# Patient Record
Sex: FEMALE | URBAN - NONMETROPOLITAN AREA
[De-identification: names, ages, dates, MRNs, and addresses within clinical notes are randomized per-mention and may not be internally consistent; named-entity substitution may affect disease eponyms.]

---

## 2023-10-18 ENCOUNTER — APPOINTMENT (RX ONLY)
Dept: URBAN - NONMETROPOLITAN AREA CLINIC 4 | Facility: CLINIC | Age: 33
Setting detail: DERMATOLOGY
End: 2023-10-18

## 2023-10-18 DIAGNOSIS — L73.9 FOLLICULAR DISORDER, UNSPECIFIED: ICD-10-CM

## 2023-10-18 DIAGNOSIS — L738 OTHER SPECIFIED DISEASES OF HAIR AND HAIR FOLLICLES: ICD-10-CM

## 2023-10-18 DIAGNOSIS — L663 OTHER SPECIFIED DISEASES OF HAIR AND HAIR FOLLICLES: ICD-10-CM

## 2023-10-18 PROBLEM — L02.426 FURUNCLE OF LEFT LOWER LIMB: Status: ACTIVE | Noted: 2023-10-18

## 2023-10-18 PROBLEM — L02.425 FURUNCLE OF RIGHT LOWER LIMB: Status: ACTIVE | Noted: 2023-10-18

## 2023-10-18 PROCEDURE — ? PRESCRIPTION

## 2023-10-18 PROCEDURE — ? COUNSELING

## 2023-10-18 PROCEDURE — 99203 OFFICE O/P NEW LOW 30 MIN: CPT

## 2023-10-18 PROCEDURE — ? PRESCRIPTION MEDICATION MANAGEMENT

## 2023-10-18 PROCEDURE — ? TREATMENT REGIMEN

## 2023-10-18 RX ORDER — CLINDAMYCIN PHOSPHATE 10 MG/ML
1% LOTION TOPICAL BID
Qty: 1 | Refills: 3 | Status: ERX | COMMUNITY
Start: 2023-10-18

## 2023-10-18 RX ADMIN — CLINDAMYCIN PHOSPHATE 1%: 10 LOTION TOPICAL at 00:00

## 2023-10-18 ASSESSMENT — LOCATION SIMPLE DESCRIPTION DERM
LOCATION SIMPLE: LEFT POSTERIOR THIGH
LOCATION SIMPLE: RIGHT POSTERIOR THIGH

## 2023-10-18 ASSESSMENT — LOCATION DETAILED DESCRIPTION DERM
LOCATION DETAILED: LEFT DISTAL POSTERIOR THIGH
LOCATION DETAILED: RIGHT DISTAL POSTERIOR THIGH

## 2023-10-18 ASSESSMENT — SEVERITY ASSESSMENT: SEVERITY: MILD TO MODERATE

## 2023-10-18 ASSESSMENT — LOCATION ZONE DERM: LOCATION ZONE: LEG

## 2023-10-18 NOTE — PROCEDURE: PRESCRIPTION MEDICATION MANAGEMENT
Render In Strict Bullet Format?: No
Detail Level: Zone
Initiate Treatment: Clindamycin 1% lotion BID

## 2023-12-18 ENCOUNTER — OFFICE VISIT (OUTPATIENT)
Dept: URGENT CARE | Facility: CLINIC | Age: 33
End: 2023-12-18
Payer: COMMERCIAL

## 2023-12-18 VITALS
OXYGEN SATURATION: 98 % | BODY MASS INDEX: 32.5 KG/M2 | WEIGHT: 176.6 LBS | HEIGHT: 62 IN | TEMPERATURE: 97.9 F | SYSTOLIC BLOOD PRESSURE: 130 MMHG | HEART RATE: 83 BPM | DIASTOLIC BLOOD PRESSURE: 87 MMHG | RESPIRATION RATE: 18 BRPM

## 2023-12-18 DIAGNOSIS — R68.89 FLU-LIKE SYMPTOMS: Primary | ICD-10-CM

## 2023-12-18 PROCEDURE — 87636 SARSCOV2 & INF A&B AMP PRB: CPT | Performed by: STUDENT IN AN ORGANIZED HEALTH CARE EDUCATION/TRAINING PROGRAM

## 2023-12-18 PROCEDURE — S9088 SERVICES PROVIDED IN URGENT: HCPCS | Performed by: STUDENT IN AN ORGANIZED HEALTH CARE EDUCATION/TRAINING PROGRAM

## 2023-12-18 PROCEDURE — 99203 OFFICE O/P NEW LOW 30 MIN: CPT | Performed by: STUDENT IN AN ORGANIZED HEALTH CARE EDUCATION/TRAINING PROGRAM

## 2023-12-18 RX ORDER — METHYLPREDNISOLONE 4 MG/1
TABLET ORAL
Qty: 21 TABLET | Refills: 0 | Status: SHIPPED | OUTPATIENT
Start: 2023-12-18

## 2023-12-18 RX ORDER — FLUTICASONE PROPIONATE 50 MCG
1 SPRAY, SUSPENSION (ML) NASAL DAILY
Qty: 16 G | Refills: 0 | Status: SHIPPED | OUTPATIENT
Start: 2023-12-18

## 2023-12-18 RX ORDER — ALBUTEROL SULFATE 90 UG/1
2 AEROSOL, METERED RESPIRATORY (INHALATION) EVERY 6 HOURS PRN
Qty: 8.5 G | Refills: 0 | Status: SHIPPED | OUTPATIENT
Start: 2023-12-18

## 2023-12-18 NOTE — PROGRESS NOTES
[facility]        NAME: Milana De La Fuente is a 33 y.o. female  : 1990    MRN: [id number]    Assessment and Plan   Flu-like symptoms [R68.89]  1. Flu-like symptoms  fluticasone (FLONASE) 50 mcg/act nasal spray    albuterol (ProAir HFA) 90 mcg/act inhaler    methylPREDNISolone 4 MG tablet therapy pack    Covid/Flu- Office Collect Normal        Suspect recurrence of illness especially given that son is sick with similar symptoms with started yesterday.  Will test for COVID and flu.  Provided isolation guidelines pending results.  Will start on Flonase for congestion.  No wheezing or respiratory distress noted on exam the patient is reporting symptoms especially at night so we will give provide and also Medrol pack.    Patient Instructions     See wrap up for details  Follow up with PCP in 3-5 days.  Proceed to  ER if symptoms worsen.    Chief Complaint     Chief Complaint   Patient presents with    Fever    Cough    Shortness of Breath     Asthma      Wheezing     Symptoms started 3 days ago. She states she can hear  herself wheezing. Fever was 102. Took tylenol this morning she said . No at home covid test was done. She said she had the flu 3 weeks ago          History of Present Illness     Today is day 4 of symptoms. Reports frontal headache, sinus pressure   tested pos for flu A. Since then has had persistent coughing and wheezing which worsened over the last 4 days.  Wheezing is worse at night and is daily and multiple times.  Used to use albuterol in the past but no longer has any  Tmax 102F 2 days ago, resolving with tylenol  Sick contacts at work and son sick with similar symptoms     Review of Systems   Review of Systems   Constitutional:  Positive for fever. Negative for chills.   HENT:  Positive for sinus pressure. Negative for ear pain and sore throat.    Eyes:  Negative for pain and visual disturbance.   Respiratory:  Positive for cough and wheezing. Negative for chest tightness and  "shortness of breath.    Cardiovascular:  Negative for chest pain and palpitations.   Gastrointestinal:  Negative for abdominal pain, constipation, diarrhea, nausea and vomiting.   Genitourinary:  Negative for dysuria, hematuria and menstrual problem.   Musculoskeletal:  Negative for arthralgias and back pain.   Skin:  Negative for color change and rash.   Neurological:  Positive for headaches. Negative for seizures and syncope.   Psychiatric/Behavioral:  Negative for dysphoric mood and suicidal ideas.    All other systems reviewed and are negative.    Current Medications       Current Outpatient Medications:     albuterol (ProAir HFA) 90 mcg/act inhaler, Inhale 2 puffs every 6 (six) hours as needed for wheezing, Disp: 8.5 g, Rfl: 0    fluticasone (FLONASE) 50 mcg/act nasal spray, 1 spray into each nostril daily, Disp: 16 g, Rfl: 0    methylPREDNISolone 4 MG tablet therapy pack, Use as directed on package, Disp: 21 tablet, Rfl: 0    Current Allergies     Allergies as of 12/18/2023    (No Known Allergies)            The following portions of the patient's history were reviewed and updated as appropriate: allergies, current medications, past family history, past medical history, past social history, past surgical history and problem list.     History reviewed. No pertinent past medical history.    History reviewed. No pertinent surgical history.    History reviewed. No pertinent family history.      Medications have been verified.        Objective   /87   Pulse 83   Temp 97.9 °F (36.6 °C)   Resp 18   Ht 5' 2\" (1.575 m)   Wt 80.1 kg (176 lb 9.6 oz)   SpO2 98%   BMI 32.30 kg/m²        Physical Exam     Physical Exam  Constitutional:       General: She is not in acute distress.     Appearance: Normal appearance.   HENT:      Head: Normocephalic and atraumatic.      Right Ear: Tympanic membrane and ear canal normal.      Left Ear: Tympanic membrane and ear canal normal.      Nose: Congestion present.      " Mouth/Throat:      Mouth: Mucous membranes are moist.      Pharynx: Oropharynx is clear. Posterior oropharyngeal erythema (Minimal) present. No oropharyngeal exudate.   Eyes:      Extraocular Movements: Extraocular movements intact.      Conjunctiva/sclera: Conjunctivae normal.   Cardiovascular:      Rate and Rhythm: Normal rate and regular rhythm.   Pulmonary:      Effort: Pulmonary effort is normal. No respiratory distress.      Breath sounds: No decreased breath sounds, wheezing, rhonchi or rales.   Musculoskeletal:      Cervical back: Normal range of motion.   Skin:     General: Skin is warm and dry.   Neurological:      General: No focal deficit present.      Mental Status: She is alert and oriented to person, place, and time.   Psychiatric:         Mood and Affect: Mood normal.         Behavior: Behavior normal.

## 2023-12-18 NOTE — LETTER
December 18, 2023     Patient: Milana De La Fuente   YOB: 1990   Date of Visit: 12/18/2023       To Whom it May Concern:    Milana De La Fuente was seen in my clinic on 12/18/2023. Please excuse from school/work till results received. Please note if Covid and Flu tests are negative, they may return to school/work when fever free for 24 hours without the use of a fever reducing agent. If Covid or Flu test is positive, they may return to school/work on 12/20/23, as this is 5 days from the onset of symptoms and as long as fever free for 24 hours without use of fever reducing medication. Upon return, they must then adhere to strict masking for an additional 5 days.       If you have any questions or concerns, please don't hesitate to call.         Sincerely,          Archana Leigh,         CC: No Recipients

## 2023-12-19 LAB
FLUAV RNA RESP QL NAA+PROBE: NEGATIVE
FLUBV RNA RESP QL NAA+PROBE: POSITIVE
SARS-COV-2 RNA RESP QL NAA+PROBE: NEGATIVE

## 2024-08-19 ENCOUNTER — OFFICE VISIT (OUTPATIENT)
Dept: URGENT CARE | Facility: CLINIC | Age: 34
End: 2024-08-19
Payer: COMMERCIAL

## 2024-08-19 VITALS
DIASTOLIC BLOOD PRESSURE: 78 MMHG | HEART RATE: 76 BPM | SYSTOLIC BLOOD PRESSURE: 115 MMHG | TEMPERATURE: 98 F | RESPIRATION RATE: 18 BRPM | OXYGEN SATURATION: 98 %

## 2024-08-19 DIAGNOSIS — H00.014 HORDEOLUM EXTERNUM OF LEFT UPPER EYELID: Primary | ICD-10-CM

## 2024-08-19 PROCEDURE — S9088 SERVICES PROVIDED IN URGENT: HCPCS

## 2024-08-19 PROCEDURE — 99213 OFFICE O/P EST LOW 20 MIN: CPT

## 2024-08-19 RX ORDER — ERYTHROMYCIN 5 MG/G
0.5 OINTMENT OPHTHALMIC EVERY 6 HOURS SCHEDULED
Qty: 27 G | Refills: 0 | Status: SHIPPED | OUTPATIENT
Start: 2024-08-19

## 2024-08-19 NOTE — LETTER
August 19, 2024     Patient: Milana De La Fuente   YOB: 1990   Date of Visit: 8/19/2024       To Whom it May Concern:    Milana De La Fuente was seen in my clinic on 8/19/2024. Please excuse from work on 8/19/2024.    If you have any questions or concerns, please don't hesitate to call.         Sincerely,          Rodrick Miranda PA-C        CC: No Recipients

## 2024-08-19 NOTE — PROGRESS NOTES
[facility]        NAME: Milana De La Fuente is a 33 y.o. female  : 1990    MRN: [id number]  DATE: 2024  TIME: 2:29 PM    Assessment and Plan   Hordeolum externum of left upper eyelid [H00.014]  1. Hordeolum externum of left upper eyelid  erythromycin (ILOTYCIN) ophthalmic ointment        Mild swelling, tenderness, redness to the left upper mid eyelid.  No injury or trauma.  No conjunctival injection or eye drainage.  Will start on erythromycin ointment encourage frequent warm compresses.  Advised follow-up family doctor/eye doctor if no improvement.    Patient Instructions     Use prescribed ointment as instructed.  Tylenol for pain or fever.  Highly recommended to do warm compresses frequently to the left upper eyelid multiple times throughout the day.  Follow-up with your eye doctor family doctor if no improvement.  Follow up with PCP in 3-5 days.  Proceed to  ER if symptoms worsen.    If tests are performed, our office will contact you with results only if changes need to made to the care plan discussed with you at the visit. [facility]    Chief Complaint     Chief Complaint   Patient presents with    Eye Problem     Pt c/o waking up with a lump on eye, looks like a sty. A little swollen and red         History of Present Illness       33-year-old female presents the clinic for left upper eyelid redness, mild swelling, pain.  Denies injury or trauma.  Denies any eye redness or drainage.  Denies having this before.  Did not take anything over-the-counter or do anything at home for the symptoms.  Denies any other symptoms at this time.    Eye Problem   Pertinent negatives include no eye discharge, eye redness, itching or photophobia.       Review of Systems   Review of Systems   Constitutional: Negative.    HENT: Negative.     Eyes:  Negative for photophobia, discharge, redness, itching and visual disturbance.        Left upper eyelid  swelling, pain, redness.   Respiratory: Negative.     Cardiovascular: Negative.    Skin: Negative.    Neurological: Negative.          Current Medications       Current Outpatient Medications:     erythromycin (ILOTYCIN) ophthalmic ointment, Administer 0.5 inches into the left eye every 6 (six) hours, Disp: 27 g, Rfl: 0    albuterol (ProAir HFA) 90 mcg/act inhaler, Inhale 2 puffs every 6 (six) hours as needed for wheezing (Patient not taking: Reported on 8/19/2024), Disp: 8.5 g, Rfl: 0    fluticasone (FLONASE) 50 mcg/act nasal spray, 1 spray into each nostril daily (Patient not taking: Reported on 8/19/2024), Disp: 16 g, Rfl: 0    methylPREDNISolone 4 MG tablet therapy pack, Use as directed on package (Patient not taking: Reported on 8/19/2024), Disp: 21 tablet, Rfl: 0    Current Allergies     Allergies as of 08/19/2024    (No Known Allergies)            The following portions of the patient's history were reviewed and updated as appropriate: allergies, current medications, past family history, past medical history, past social history, past surgical history and problem list.     History reviewed. No pertinent past medical history.    History reviewed. No pertinent surgical history.    History reviewed. No pertinent family history.      Medications have been verified.        Objective   /78   Pulse 76   Temp 98 °F (36.7 °C)   Resp 18   SpO2 98%        Physical Exam     Physical Exam  Constitutional:       General: She is not in acute distress.     Appearance: Normal appearance. She is not ill-appearing or diaphoretic.   Eyes:      General: Vision grossly intact. No visual field deficit.        Right eye: No foreign body, discharge or hordeolum.         Left eye: Hordeolum present.No foreign body (Left upper mid eyelid.  Mild tenderness and redness.  No periorbital edema or ecchymosis.  No pain with extraocular movements.) or discharge.      Extraocular Movements: Extraocular movements intact.      Right  eye: Normal extraocular motion and no nystagmus.      Left eye: Normal extraocular motion and no nystagmus.      Conjunctiva/sclera: Conjunctivae normal.      Right eye: Right conjunctiva is not injected. No chemosis, exudate or hemorrhage.     Left eye: Left conjunctiva is not injected. No chemosis, exudate or hemorrhage.     Pupils: Pupils are equal, round, and reactive to light.     Skin:     General: Skin is warm and dry.      Capillary Refill: Capillary refill takes less than 2 seconds.   Neurological:      Mental Status: She is alert and oriented to person, place, and time.

## 2024-10-01 ENCOUNTER — OFFICE VISIT (OUTPATIENT)
Dept: URGENT CARE | Facility: CLINIC | Age: 34
End: 2024-10-01
Payer: COMMERCIAL

## 2024-10-01 VITALS
HEART RATE: 90 BPM | RESPIRATION RATE: 18 BRPM | DIASTOLIC BLOOD PRESSURE: 90 MMHG | SYSTOLIC BLOOD PRESSURE: 134 MMHG | OXYGEN SATURATION: 99 % | TEMPERATURE: 98 F

## 2024-10-01 DIAGNOSIS — J02.9 ACUTE PHARYNGITIS, UNSPECIFIED ETIOLOGY: ICD-10-CM

## 2024-10-01 DIAGNOSIS — R49.0 VOICE HOARSENESS: Primary | ICD-10-CM

## 2024-10-01 LAB — S PYO AG THROAT QL: NEGATIVE

## 2024-10-01 PROCEDURE — 87880 STREP A ASSAY W/OPTIC: CPT | Performed by: PHYSICIAN ASSISTANT

## 2024-10-01 PROCEDURE — 99213 OFFICE O/P EST LOW 20 MIN: CPT | Performed by: PHYSICIAN ASSISTANT

## 2024-10-01 PROCEDURE — S9088 SERVICES PROVIDED IN URGENT: HCPCS | Performed by: PHYSICIAN ASSISTANT

## 2024-10-01 RX ORDER — AMOXICILLIN 500 MG/1
500 CAPSULE ORAL EVERY 8 HOURS SCHEDULED
Qty: 21 CAPSULE | Refills: 0 | Status: SHIPPED | OUTPATIENT
Start: 2024-10-01 | End: 2024-10-08

## 2024-10-01 RX ORDER — METHYLPREDNISOLONE 4 MG
TABLET, DOSE PACK ORAL
Qty: 21 TABLET | Refills: 0 | Status: SHIPPED | OUTPATIENT
Start: 2024-10-01

## 2024-10-01 NOTE — PROGRESS NOTES
"  [facility]        NAME: Milana De La Fuente is a 33 y.o. female  : 1990    MRN: [id number]  DATE: 2024  TIME: 4:19 PM    Assessment and Plan   Voice hoarseness [R49.0]  1. Voice hoarseness  Ambulatory Referral to Otolaryngology    amoxicillin (AMOXIL) 500 mg capsule      2. Acute pharyngitis, unspecified etiology  methylPREDNISolone 4 MG tablet therapy pack    POCT rapid ANTIGEN strepA          Patient Instructions     Sxs appear due to infection at present, but patient encouraged to f/u with ENT if hoarseness re-occurs or persists  Take medicine as prescribed  Follow up with PCP in 3-5 days.  Proceed to  ER if symptoms worsen.    If tests have been performed at [facility], our office will contact you with results if changes need to be made to the care plan discussed with you at the visit.  [facility]    Chief Complaint     Chief Complaint   Patient presents with    Sore Throat     Initial onset 4 days ago denies fever chills lost voice for first 2 days previous hx of \"nodule\" on vocal cords  in the past          History of Present Illness       Sore Throat   This is a new problem. Episode onset: 4 days ago. The problem has been waxing and waning. There has been no fever. Associated symptoms include congestion and a hoarse voice (pt has hx of voice hoarseness and was found to have vocal cord nodule, which resolved without tx). Pertinent negatives include no coughing, diarrhea, shortness of breath, swollen glands, trouble swallowing or vomiting. She has tried NSAIDs for the symptoms.       Review of Systems   Review of Systems   Constitutional:  Negative for fatigue and fever.   HENT:  Positive for congestion, hoarse voice (pt has hx of voice hoarseness and was found to have vocal cord nodule, which resolved without tx) and sore throat. Negative for trouble swallowing.    Respiratory:  Negative for cough, chest tightness and shortness of breath.  "   Gastrointestinal:  Negative for diarrhea and vomiting.         Current Medications       Current Outpatient Medications:     amoxicillin (AMOXIL) 500 mg capsule, Take 1 capsule (500 mg total) by mouth every 8 (eight) hours for 7 days, Disp: 21 capsule, Rfl: 0    methylPREDNISolone 4 MG tablet therapy pack, Use as directed on package, Disp: 21 tablet, Rfl: 0    Current Allergies     Allergies as of 10/01/2024    (No Known Allergies)            The following portions of the patient's history were reviewed and updated as appropriate: allergies, current medications, past family history, past medical history, past social history, past surgical history and problem list.     No past medical history on file.    No past surgical history on file.    No family history on file.      Medications have been verified.        Objective   /90   Pulse 90   Temp 98 °F (36.7 °C)   Resp 18   SpO2 99%   No LMP recorded.       Physical Exam     Physical Exam  Constitutional:       Appearance: She is well-developed.   HENT:      Head: Normocephalic.      Right Ear: Hearing, tympanic membrane, ear canal and external ear normal. No drainage, swelling or tenderness. No middle ear effusion. Tympanic membrane is not erythematous.      Left Ear: Hearing, tympanic membrane, ear canal and external ear normal. No drainage, swelling or tenderness.  No middle ear effusion. Tympanic membrane is not erythematous.      Nose: Nose normal. No mucosal edema, congestion or rhinorrhea.      Mouth/Throat:      Mouth: Mucous membranes are normal. Mucous membranes are moist.      Pharynx: Pharyngeal swelling, oropharyngeal exudate and posterior oropharyngeal erythema present. No posterior oropharyngeal edema.      Tonsils: No tonsillar exudate. 0 on the right. 0 on the left.   Cardiovascular:      Rate and Rhythm: Normal rate and regular rhythm.      Heart sounds: Normal heart sounds. No murmur heard.     No friction rub. No gallop.   Pulmonary:       Effort: Pulmonary effort is normal. No respiratory distress.      Breath sounds: Normal breath sounds. No stridor. No decreased breath sounds, wheezing, rhonchi or rales.   Abdominal:      General: Bowel sounds are normal. There is no distension.      Palpations: Abdomen is soft. There is no mass.      Tenderness: There is no abdominal tenderness. There is no guarding or rebound.   Lymphadenopathy:      Cervical: No cervical adenopathy.      Right cervical: No superficial cervical adenopathy.     Left cervical: No superficial cervical adenopathy.

## 2025-02-19 ENCOUNTER — OFFICE VISIT (OUTPATIENT)
Dept: FAMILY MEDICINE CLINIC | Facility: CLINIC | Age: 35
End: 2025-02-19
Payer: COMMERCIAL

## 2025-02-19 VITALS
TEMPERATURE: 96.2 F | HEIGHT: 62 IN | WEIGHT: 178.2 LBS | BODY MASS INDEX: 32.79 KG/M2 | OXYGEN SATURATION: 96 % | HEART RATE: 89 BPM

## 2025-02-19 DIAGNOSIS — R49.0 HOARSENESS: Primary | ICD-10-CM

## 2025-02-19 DIAGNOSIS — Z86.39 HISTORY OF VITAMIN D DEFICIENCY: ICD-10-CM

## 2025-02-19 DIAGNOSIS — Z11.4 SCREENING FOR HIV (HUMAN IMMUNODEFICIENCY VIRUS): ICD-10-CM

## 2025-02-19 DIAGNOSIS — M54.9 UPPER BACK PAIN: ICD-10-CM

## 2025-02-19 DIAGNOSIS — Z91.09 ENVIRONMENTAL ALLERGIES: ICD-10-CM

## 2025-02-19 DIAGNOSIS — L65.9 HAIR LOSS: ICD-10-CM

## 2025-02-19 DIAGNOSIS — Z11.59 NEED FOR HEPATITIS C SCREENING TEST: ICD-10-CM

## 2025-02-19 PROCEDURE — 99204 OFFICE O/P NEW MOD 45 MIN: CPT | Performed by: PHYSICIAN ASSISTANT

## 2025-02-19 RX ORDER — FAMOTIDINE 20 MG/1
20 TABLET, FILM COATED ORAL 2 TIMES DAILY
COMMUNITY
Start: 2024-10-29 | End: 2025-10-29

## 2025-02-19 RX ORDER — TOPIRAMATE 25 MG/1
1 TABLET, FILM COATED ORAL 2 TIMES DAILY
COMMUNITY
Start: 2025-01-02

## 2025-02-19 RX ORDER — BUPROPION HYDROCHLORIDE 300 MG/1
300 TABLET ORAL
COMMUNITY
Start: 2024-09-11

## 2025-02-19 RX ORDER — PHENTERMINE HYDROCHLORIDE 37.5 MG/1
37.5 CAPSULE ORAL
COMMUNITY

## 2025-02-19 NOTE — PROGRESS NOTES
Name: Milana De La Fuente      : 1990      MRN: [id number]  Encounter Provider: El Salgado PA-C  Encounter Date: 2025   Encounter department: [facility]  :  Assessment & Plan  Hoarseness     - Was seen by ENT [facility] in 10/2024 for longstanding issue with vocal hoarseness and was told that issue was largely s/p to uncontrolled rhinitis, GERD    - Was prescribed flonase, allegra QD along with pepcid BID    - Has not been taking flonase or allegra QD, continues with pepcid   - Allergy symptoms have been controlled w/o medication    - Patient requesting new referral to ENT as she states insurance will not cover follow up with ENT without referral.   - Patient did state that [facility] did complete skinprick allergy testing, results unavailable for PCP review    - Referral placed  Orders:    Ambulatory Referral to Otolaryngology; Future    Environmental allergies    Orders:    Ambulatory Referral to Otolaryngology; Future    Upper back pain     - Patient has been dealing with upper back pain s/p breast augmentation completed in  4 years ago    - Pain has been gradually getting worse, denies any other known injury mechanism to back    - Pain refractive to NSAID therapy which she states she uses occasionally    - Back physical exam WNL, PCP advised that pain is likely s/p to back being unable to compensate for increased load of breast augmentation. Will refer to PT at this time for strengthening of back but did advise patient that breast reduction may have to be considered in future   Orders:    Ambulatory Referral to Physical Therapy; Future    History of vitamin D deficiency    Orders:    Vitamin D 25 hydroxy; Future    Hair loss     - Pt concerned about noted hair loss x 2 months. Hair always noticeable on clothing, pillow cases and comes out in clumps when brushing    - No obvious hair loss patches on exam, will obtain labs to r/o organic cause at this time and manage as appropriate.  "  Orders:    CBC and differential; Future    Comprehensive metabolic panel; Future    TSH, 3rd generation; Future    T4, free; Future    Iron Panel (Includes Ferritin, Iron Sat%, Iron, and TIBC); Future    Screening for HIV (human immunodeficiency virus)    Orders:    HIV 1/2 AG/AB w Reflex [facility] for 2 yr old and above; Future    Need for hepatitis C screening test    Orders:    Hepatitis C antibody; Future    BMI 32.0-32.9,adult     - Elevated BMI, currently seeing weight management in [place]    - Advised continue follow up with specialty.       Patient is a 34 year old female PMH vitamin D deficiency presenting to establish care. Management as described above.        History of Present Illness   Milana De La Fuente is a 34 y.o. female presenting today to establish care.    Concern is hair loss. Patient notes that this has been ongoing for 2 months. Patient describes that she routinely finds loose hair on clothing, pillow cases, etc and that it comes out \"in clumps\" when combing.     Patient has also been dealing w/ upper back pain x 4 years. Patient notably had breast augmentation 4 years ago and has subsequently been dealing with worsening upper back pain. Denies any other known injury mechanism and pain is refractive to NSAID therapy.       Review of Systems   Constitutional:  Negative for fatigue and fever.   Respiratory:  Negative for cough and shortness of breath.    Cardiovascular:  Negative for chest pain.   Gastrointestinal:  Negative for diarrhea, nausea and vomiting.   Neurological:  Negative for headaches.       Objective   Pulse 89   Temp (!) 96.2 °F (35.7 °C) (Tympanic)   Ht 5' 2\" (1.575 m)   Wt 80.8 kg (178 lb 3.2 oz)   SpO2 96%   BMI 32.59 kg/m²      Physical Exam  Constitutional:       Appearance: Normal appearance.   HENT:      Head: Normocephalic.      Right Ear: External ear normal.      Left Ear: External ear normal.      Nose: Nose normal.      Mouth/Throat:      Mouth: Mucous membranes " are moist.      Pharynx: Oropharynx is clear.   Eyes:      Conjunctiva/sclera: Conjunctivae normal.   Cardiovascular:      Rate and Rhythm: Normal rate and regular rhythm.      Heart sounds: Normal heart sounds.   Pulmonary:      Effort: Pulmonary effort is normal.      Breath sounds: Normal breath sounds.   Abdominal:      General: Bowel sounds are normal.      Palpations: Abdomen is soft.   Neurological:      Mental Status: She is alert and oriented to person, place, and time.   Psychiatric:         Behavior: Behavior normal.

## 2025-02-20 ENCOUNTER — TELEPHONE (OUTPATIENT)
Dept: ADMINISTRATIVE | Facility: OTHER | Age: 35
End: 2025-02-20

## 2025-02-20 NOTE — TELEPHONE ENCOUNTER
----- Message from El Salgado PA-C sent at 2/19/2025  3:18 PM EST -----  Regarding: Care Gap Request  02/19/25 3:18 PM    Hello, our patient above has had Pap Smear (HPV) aka Cervical Cancer Screening completed/performed. Please assist in updating the patient chart by pulling the document from labs Tab within Chart Review. The date of service is 5/14/2024.     Thank you,  El Salgado PA-C  [facility]

## 2025-02-20 NOTE — TELEPHONE ENCOUNTER
Upon review of the In Basket request we were able to locate, review, and update the patient chart as requested for Pap Smear (HPV) aka Cervical Cancer Screening.    Any additional questions or concerns should be emailed to the Practice Liaisons via the appropriate education email address, please do not reply via In Basket.    Thank you  Faizan Clements MA   [facility]

## 2025-02-27 ENCOUNTER — APPOINTMENT (OUTPATIENT)
Dept: LAB | Facility: CLINIC | Age: 35
End: 2025-02-27
Payer: COMMERCIAL

## 2025-02-27 DIAGNOSIS — L65.9 HAIR LOSS: ICD-10-CM

## 2025-02-27 DIAGNOSIS — Z11.4 SCREENING FOR HIV (HUMAN IMMUNODEFICIENCY VIRUS): ICD-10-CM

## 2025-02-27 DIAGNOSIS — Z11.59 NEED FOR HEPATITIS C SCREENING TEST: ICD-10-CM

## 2025-02-27 DIAGNOSIS — Z86.39 HISTORY OF VITAMIN D DEFICIENCY: ICD-10-CM

## 2025-02-27 LAB
25(OH)D3 SERPL-MCNC: 33.4 NG/ML (ref 30–100)
ALBUMIN SERPL BCG-MCNC: 4.5 G/DL (ref 3.5–5)
ALP SERPL-CCNC: 77 U/L (ref 34–104)
ALT SERPL W P-5'-P-CCNC: 19 U/L (ref 7–52)
ANION GAP SERPL CALCULATED.3IONS-SCNC: 7 MMOL/L (ref 4–13)
AST SERPL W P-5'-P-CCNC: 16 U/L (ref 13–39)
BASOPHILS # BLD AUTO: 0.02 THOUSANDS/ÂΜL (ref 0–0.1)
BASOPHILS NFR BLD AUTO: 0 % (ref 0–1)
BILIRUB SERPL-MCNC: 0.44 MG/DL (ref 0.2–1)
BUN SERPL-MCNC: 13 MG/DL (ref 5–25)
CALCIUM SERPL-MCNC: 9.7 MG/DL (ref 8.4–10.2)
CHLORIDE SERPL-SCNC: 106 MMOL/L (ref 96–108)
CO2 SERPL-SCNC: 24 MMOL/L (ref 21–32)
CREAT SERPL-MCNC: 0.79 MG/DL (ref 0.6–1.3)
EOSINOPHIL # BLD AUTO: 0.15 THOUSAND/ÂΜL (ref 0–0.61)
EOSINOPHIL NFR BLD AUTO: 2 % (ref 0–6)
ERYTHROCYTE [DISTWIDTH] IN BLOOD BY AUTOMATED COUNT: 12.2 % (ref 11.6–15.1)
FERRITIN SERPL-MCNC: 12 NG/ML (ref 11–307)
GFR SERPL CREATININE-BSD FRML MDRD: 98 ML/MIN/1.73SQ M
GLUCOSE P FAST SERPL-MCNC: 94 MG/DL (ref 65–99)
HCT VFR BLD AUTO: 42.8 % (ref 34.8–46.1)
HCV AB SER QL: NORMAL
HGB BLD-MCNC: 14.1 G/DL (ref 11.5–15.4)
HIV 1+2 AB+HIV1 P24 AG SERPL QL IA: NORMAL
IMM GRANULOCYTES # BLD AUTO: 0.04 THOUSAND/UL (ref 0–0.2)
IMM GRANULOCYTES NFR BLD AUTO: 1 % (ref 0–2)
IRON SATN MFR SERPL: 13 % (ref 15–50)
IRON SERPL-MCNC: 49 UG/DL (ref 50–212)
LYMPHOCYTES # BLD AUTO: 1.88 THOUSANDS/ÂΜL (ref 0.6–4.47)
LYMPHOCYTES NFR BLD AUTO: 22 % (ref 14–44)
MCH RBC QN AUTO: 30.9 PG (ref 26.8–34.3)
MCHC RBC AUTO-ENTMCNC: 32.9 G/DL (ref 31.4–37.4)
MCV RBC AUTO: 94 FL (ref 82–98)
MONOCYTES # BLD AUTO: 0.63 THOUSAND/ÂΜL (ref 0.17–1.22)
MONOCYTES NFR BLD AUTO: 7 % (ref 4–12)
NEUTROPHILS # BLD AUTO: 5.88 THOUSANDS/ÂΜL (ref 1.85–7.62)
NEUTS SEG NFR BLD AUTO: 68 % (ref 43–75)
NRBC BLD AUTO-RTO: 0 /100 WBCS
PLATELET # BLD AUTO: 339 THOUSANDS/UL (ref 149–390)
PMV BLD AUTO: 9.9 FL (ref 8.9–12.7)
POTASSIUM SERPL-SCNC: 4.1 MMOL/L (ref 3.5–5.3)
PROT SERPL-MCNC: 8 G/DL (ref 6.4–8.4)
RBC # BLD AUTO: 4.56 MILLION/UL (ref 3.81–5.12)
SODIUM SERPL-SCNC: 137 MMOL/L (ref 135–147)
T4 FREE SERPL-MCNC: 0.78 NG/DL (ref 0.61–1.12)
TIBC SERPL-MCNC: 376.6 UG/DL (ref 250–450)
TRANSFERRIN SERPL-MCNC: 269 MG/DL (ref 203–362)
TSH SERPL DL<=0.05 MIU/L-ACNC: 1.82 UIU/ML (ref 0.45–4.5)
UIBC SERPL-MCNC: 328 UG/DL (ref 155–355)
WBC # BLD AUTO: 8.6 THOUSAND/UL (ref 4.31–10.16)

## 2025-02-27 PROCEDURE — 84443 ASSAY THYROID STIM HORMONE: CPT

## 2025-02-27 PROCEDURE — 36415 COLL VENOUS BLD VENIPUNCTURE: CPT

## 2025-02-27 PROCEDURE — 87389 HIV-1 AG W/HIV-1&-2 AB AG IA: CPT

## 2025-02-27 PROCEDURE — 86803 HEPATITIS C AB TEST: CPT

## 2025-02-27 PROCEDURE — 82728 ASSAY OF FERRITIN: CPT

## 2025-02-27 PROCEDURE — 83550 IRON BINDING TEST: CPT

## 2025-02-27 PROCEDURE — 84439 ASSAY OF FREE THYROXINE: CPT

## 2025-02-27 PROCEDURE — 80053 COMPREHEN METABOLIC PANEL: CPT

## 2025-02-27 PROCEDURE — 82306 VITAMIN D 25 HYDROXY: CPT

## 2025-02-27 PROCEDURE — 85025 COMPLETE CBC W/AUTO DIFF WBC: CPT

## 2025-02-27 PROCEDURE — 83540 ASSAY OF IRON: CPT

## 2025-02-28 ENCOUNTER — RESULTS FOLLOW-UP (OUTPATIENT)
Dept: FAMILY MEDICINE CLINIC | Facility: CLINIC | Age: 35
End: 2025-02-28

## 2025-03-03 ENCOUNTER — EVALUATION (OUTPATIENT)
Dept: PHYSICAL THERAPY | Facility: CLINIC | Age: 35
End: 2025-03-03
Payer: COMMERCIAL

## 2025-03-03 DIAGNOSIS — M54.9 UPPER BACK PAIN: Primary | ICD-10-CM

## 2025-03-03 PROCEDURE — 97110 THERAPEUTIC EXERCISES: CPT

## 2025-03-03 PROCEDURE — 97140 MANUAL THERAPY 1/> REGIONS: CPT

## 2025-03-03 PROCEDURE — 97161 PT EVAL LOW COMPLEX 20 MIN: CPT

## 2025-03-03 NOTE — LETTER
March 3, 2025    Bailey Strong MD  [address] [place] [id number]    Patient: Milana De La Fuente   YOB: 1990   Date of Visit: 3/3/2025     Encounter Diagnosis     ICD-10-CM    1. Upper back pain  M54.9           Dear Dr. Strong:    Thank you for your recent referral of Milana De La Fuente. Please review the attached evaluation summary from Milana's recent visit.     Please verify that you agree with the plan of care by signing the attached order.     If you have any questions or concerns, please do not hesitate to call.     I sincerely appreciate the opportunity to share in the care of one of your patients and hope to have another opportunity to work with you in the near future.       Sincerely,    Octavio Fuentes, PT      Referring Provider:      I certify that I have read the below Plan of Care and certify the need for these services furnished under this plan of treatment while under my care.                    Bailey Strong MD  [address] [place] [id number]  Via In Basket          PT Evaluation     Today's date: 3/3/2025  Patient name: Milana De La Fuente  : 1990  MRN: [id number]  Referring provider: Bailey Strong,*  Dx:   Encounter Diagnosis     ICD-10-CM    1. Upper back pain  M54.9           Start Time: 1420  Stop Time: 1505  Total time in clinic (min): 45 minutes    Assessment  Impairments: abnormal muscle tone, abnormal or restricted ROM, activity intolerance, pain with function, poor body mechanics, participation limitations, activity limitations and endurance  Symptom irritability: moderate    Assessment details: 3/3: Milana is a 34 year old female with complaint of midline thoracic back pain.  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  Recent onset of back pain within the last 6 months. Pt states she has also gained weight and has stopped resistance training (cardio only) secondary to the back  pain.  Presents with increased paraspinal tone, hypomobility of thoracic spine segments and mild decrease in periscapular/shoulder strength.  Rounded shoulders and forward head.   She will benefit from a comprehensive program to address deficits, improve strength and postural endurance to improve QOL outside of clinic.   Barriers to therapy: Job demands  Understanding of Dx/Px/POC: good     Prognosis: good    Goals  STGs:  1. Patient will be independent with HEP by 2-3 visits.  2. Patient will report a 50% reduction in mid back pain allowing for improve tolerance for sustained postures by 3-4 weeks.  3. Posture will be improved to wnl for improved tolerance with daily activities by 3-4 weeks.   4. Patient will demonstrate improvements with thoracic spine mobility to wnl for improved tolerance with adls and home duties by discharge.      LTGs:  1. Improve FOTO score from 52 to 68 indicating improved tolerance with activities involving the mid back by discharge.   2. Patient will demonstrate full, pain free strength and rom to the thoracic spine allowing for improved tolerance with adls by discharge.   3. Patient will report pain levels not exceeding 2/10 during all activities by discharge.      Plan  Patient would benefit from: skilled physical therapy  Planned modality interventions: cryotherapy and thermotherapy: hydrocollator packs    Planned therapy interventions: activity modification, abdominal trunk stabilization, balance, body mechanics training, flexibility, functional ROM exercises, home exercise program, IADL retraining, joint mobilization, kinesiology taping, manual therapy, neuromuscular re-education, patient education, postural training, strengthening, stretching, therapeutic activities and therapeutic exercise    Frequency: 2x week  Duration in weeks: 6  Plan of Care beginning date: 3/3/2025  Plan of Care expiration date: 4/14/2025  Treatment plan discussed with: patient  Plan details: Patient will  continue to benefit from skilled physical therapy to address the functional deficits that were identified during the evaluation today. We will continue to progress the therapy program to address these functional deficits and achieve the established goals.   Patient informed that from this point forward, to ensure adherence to the aforementioned plan of care, all or some of the treatment may be performed and carried out by a Physical Therapy Assistant (PTA) with supervision from a licensed Physical Therapist (PT) in accordance with [facility] Physical Therapy Practice Act.              Subjective Evaluation    History of Present Illness  Date of onset: 9/3/2024  Mechanism of injury: Pt had breat implant surgery 3.5 years ago.  States over the last 6 months she has developed thoracic back pain.  Ongoing through midline and gets worse after a workday and with home care.   Patient Goals  Patient goals for therapy: decreased pain, increased strength and increased motion  Patient goal: Get back to resistance training at the gym  Pain  Current pain rating: 3  At worst pain ratin (after a workday and with cleaning/home care)  Location: midback  Quality: throbbing, discomfort, pressure and burning  Relieving factors: medications (tylenol PRN)  Progression: worsening    Social Support  Lives with: young children    Employment status: working  Hand dominance: right  Exercise history: 2x a week at gym    Treatments  Current treatment: medication        Objective     Postural Observations  Correction of posture: makes symptoms better    Additional Postural Observation Details  3/3 Pt quick to fatigue when placed into proper posture    Palpation   Left   Hypertonic in the cervical interspinals and cervical paraspinals.     Right   Hypertonic in the cervical interspinals and cervical paraspinals.     Active Range of Motion   Cervical/Thoracic Spine       Thoracic    Left lateral flexion: 35 degrees    Restriction  level: moderate  Right lateral flexion: 45 degrees     Joint Play     Hypomobile: T3, T4, T5, T6, T7 and T8     Strength/Myotome Testing     Left Shoulder     Planes of Motion   Flexion: 4+   Extension: 4+   Abduction: 4   External rotation at 45°: 4+   External rotation at 90°: 4+   Horizontal abduction: 4   Horizontal adduction: 4     Right Shoulder     Planes of Motion   Flexion: 4+   Extension: 4+   Abduction: 4   External rotation at 45°: 4+   External rotation at 90°: 4+   Horizontal abduction: 4   Horizontal adduction: 4       Flowsheet Rows      Flowsheet Row Most Recent Value   PT/OT G-Codes    Current Score 52   Projected Score 68               Precautions: breast augmentation 3.5 years ago      Date 3/3       FOTO 52 TS        Manuals        STM thoracic paraspinals TS        Grade 3 and 4 PA thoracic spine mobs TS                       Neuro Re-Ed        Serratus activation at wall with roll        UBE reverse        No Money        Horiz Abd                                 Ther Ex        Cat Camel 15       Open Book 15       Thoracic extension with roll                                                Ther Activity                        Gait Training                        Modalities

## 2025-03-03 NOTE — PROGRESS NOTES
PT Evaluation     Today's date: 3/3/2025  Patient name: Milana De La Fuente  : 1990  MRN: [id number]  Referring provider: Bailey Strong,*  Dx:   Encounter Diagnosis     ICD-10-CM    1. Upper back pain  M54.9           Start Time: 1420  Stop Time: 1505  Total time in clinic (min): 45 minutes    Assessment  Impairments: abnormal muscle tone, abnormal or restricted ROM, activity intolerance, pain with function, poor body mechanics, participation limitations, activity limitations and endurance  Symptom irritability: moderate    Assessment details: 3/3: Milana is a 34 year old female with complaint of midline thoracic back pain.  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  Recent onset of back pain within the last 6 months. Pt states she has also gained weight and has stopped resistance training (cardio only) secondary to the back pain.  Presents with increased paraspinal tone, hypomobility of thoracic spine segments and mild decrease in periscapular/shoulder strength.  Rounded shoulders and forward head.   She will benefit from a comprehensive program to address deficits, improve strength and postural endurance to improve QOL outside of clinic.   Barriers to therapy: Job demands  Understanding of Dx/Px/POC: good     Prognosis: good    Goals  STGs:  1. Patient will be independent with HEP by 2-3 visits.  2. Patient will report a 50% reduction in mid back pain allowing for improve tolerance for sustained postures by 3-4 weeks.  3. Posture will be improved to wnl for improved tolerance with daily activities by 3-4 weeks.   4. Patient will demonstrate improvements with thoracic spine mobility to wnl for improved tolerance with adls and home duties by discharge.      LTGs:  1. Improve FOTO score from 52 to 68 indicating improved tolerance with activities involving the mid back by discharge.   2. Patient will demonstrate full, pain free strength and rom to the thoracic  spine allowing for improved tolerance with adls by discharge.   3. Patient will report pain levels not exceeding 2/10 during all activities by discharge.      Plan  Patient would benefit from: skilled physical therapy  Planned modality interventions: cryotherapy and thermotherapy: hydrocollator packs    Planned therapy interventions: activity modification, abdominal trunk stabilization, balance, body mechanics training, flexibility, functional ROM exercises, home exercise program, IADL retraining, joint mobilization, kinesiology taping, manual therapy, neuromuscular re-education, patient education, postural training, strengthening, stretching, therapeutic activities and therapeutic exercise    Frequency: 2x week  Duration in weeks: 6  Plan of Care beginning date: 3/3/2025  Plan of Care expiration date: 4/14/2025  Treatment plan discussed with: patient  Plan details: Patient will continue to benefit from skilled physical therapy to address the functional deficits that were identified during the evaluation today. We will continue to progress the therapy program to address these functional deficits and achieve the established goals.   Patient informed that from this point forward, to ensure adherence to the aforementioned plan of care, all or some of the treatment may be performed and carried out by a Physical Therapy Assistant (PTA) with supervision from a licensed Physical Therapist (PT) in accordance with [facility] Physical Therapy Practice Act.              Subjective Evaluation    History of Present Illness  Date of onset: 9/3/2024  Mechanism of injury: Pt had breat implant surgery 3.5 years ago.  States over the last 6 months she has developed thoracic back pain.  Ongoing through midline and gets worse after a workday and with home care.   Patient Goals  Patient goals for therapy: decreased pain, increased strength and increased motion  Patient goal: Get back to resistance training at the  gym  Pain  Current pain rating: 3  At worst pain ratin (after a workday and with cleaning/home care)  Location: midback  Quality: throbbing, discomfort, pressure and burning  Relieving factors: medications (tylenol PRN)  Progression: worsening    Social Support  Lives with: young children    Employment status: working  Hand dominance: right  Exercise history: 2x a week at gym    Treatments  Current treatment: medication        Objective     Postural Observations  Correction of posture: makes symptoms better    Additional Postural Observation Details  3/3 Pt quick to fatigue when placed into proper posture    Palpation   Left   Hypertonic in the cervical interspinals and cervical paraspinals.     Right   Hypertonic in the cervical interspinals and cervical paraspinals.     Active Range of Motion   Cervical/Thoracic Spine       Thoracic    Left lateral flexion: 35 degrees    Restriction level: moderate  Right lateral flexion: 45 degrees     Joint Play     Hypomobile: T3, T4, T5, T6, T7 and T8     Strength/Myotome Testing     Left Shoulder     Planes of Motion   Flexion: 4+   Extension: 4+   Abduction: 4   External rotation at 45°: 4+   External rotation at 90°: 4+   Horizontal abduction: 4   Horizontal adduction: 4     Right Shoulder     Planes of Motion   Flexion: 4+   Extension: 4+   Abduction: 4   External rotation at 45°: 4+   External rotation at 90°: 4+   Horizontal abduction: 4   Horizontal adduction: 4       Flowsheet Rows      Flowsheet Row Most Recent Value   PT/OT G-Codes    Current Score 52   Projected Score 68               Precautions: breast augmentation 3.5 years ago      Date 3/3       FOTO 52 TS        Manuals        STM thoracic paraspinals TS        Grade 3 and 4 PA thoracic spine mobs TS                       Neuro Re-Ed        Serratus activation at wall with roll        UBE reverse        No Money        Horiz Abd                                 Ther Ex        Cat Camel 15       Open Book 15        Thoracic extension with roll                                                Ther Activity                        Gait Training                        Modalities

## 2025-03-05 ENCOUNTER — OFFICE VISIT (OUTPATIENT)
Dept: FAMILY MEDICINE CLINIC | Facility: CLINIC | Age: 35
End: 2025-03-05
Payer: COMMERCIAL

## 2025-03-05 VITALS
BODY MASS INDEX: 31.61 KG/M2 | OXYGEN SATURATION: 100 % | SYSTOLIC BLOOD PRESSURE: 112 MMHG | WEIGHT: 178.4 LBS | DIASTOLIC BLOOD PRESSURE: 80 MMHG | HEIGHT: 63 IN | HEART RATE: 81 BPM | TEMPERATURE: 96.9 F

## 2025-03-05 DIAGNOSIS — E61.1 IRON DEFICIENCY: ICD-10-CM

## 2025-03-05 DIAGNOSIS — Z00.00 ANNUAL PHYSICAL EXAM: ICD-10-CM

## 2025-03-05 DIAGNOSIS — Z86.39 HISTORY OF VITAMIN D DEFICIENCY: Primary | ICD-10-CM

## 2025-03-05 PROCEDURE — 99395 PREV VISIT EST AGE 18-39: CPT | Performed by: PHYSICIAN ASSISTANT

## 2025-03-05 PROCEDURE — 99214 OFFICE O/P EST MOD 30 MIN: CPT | Performed by: PHYSICIAN ASSISTANT

## 2025-03-05 RX ORDER — PNV NO.95/FERROUS FUM/FOLIC AC 28MG-0.8MG
1 TABLET ORAL DAILY
Qty: 90 TABLET | Refills: 1 | Status: SHIPPED | OUTPATIENT
Start: 2025-03-05

## 2025-03-05 RX ORDER — OMEGA-3S/DHA/EPA/FISH OIL/D3 300MG-1000
400 CAPSULE ORAL DAILY
Qty: 90 TABLET | Refills: 0 | Status: SHIPPED | OUTPATIENT
Start: 2025-03-05

## 2025-03-05 NOTE — PROGRESS NOTES
Adult Annual Physical  Name: Milana De La Fuente      : 1990      MRN: [id number]  Encounter Provider: El Salgado PA-C  Encounter Date: 3/5/2025   Encounter department: [facility] PRIMARY [facility]ARE    Assessment & Plan  History of vitamin D deficiency     - Labs showed sufficient vitamin D but still low at 34, could benefit from daily supplementation   Orders:    cholecalciferol (VITAMIN D3) 400 units tablet; Take 1 tablet (400 Units total) by mouth daily    Iron deficiency     - No anemia but labs significant for decreased iron    - Will supplement daily and recheck in 3 months   Orders:    Ferrous Sulfate (Iron) 325 (65 Fe) MG TABS; Take 1 tablet (325 mg total) by mouth daily    Annual physical exam           Immunizations and preventive care screenings were discussed with patient today. Appropriate education was printed on patient's after visit summary. Last pap smear in May 24, next due May 2029     Counseling:  Dental Health: discussed importance of regular tooth brushing, flossing, and dental visits.  Sexual health: discussed sexually transmitted diseases, partner selection, use of condoms, avoidance of unintended pregnancy, and contraceptive alternatives.  Exercise: the importance of regular exercise/physical activity was discussed. Recommend exercise 3-5 times per week for at least 30 minutes.          History of Present Illness     Adult Annual Physical:  Patient presents for annual physical.     Diet and Physical Activity:  - Diet/Nutrition: well balanced diet and consuming 3-5 servings of fruits/vegetables daily. high carb intake  - Exercise: no formal exercise. busy at work    General Health:  - Sleep: 4-6 hours of sleep on average and sleeps well.  - Hearing: normal hearing bilateral ears.  - Vision: wears glasses, goes for regular eye exams and most recent eye exam < 1 year ago.  - Dental: regular dental visits and brushes teeth twice daily.    /GYN Health:  - Follows with GYN:  "yes.   - Last menstrual cycle: 2/26/2025.   - History of STDs: no  - Contraception:. no contraception      Advanced Care Planning:  - Has an advanced directive?: no    - Has a durable medical POA?: no      Review of Systems   Constitutional:  Negative for fatigue and fever.   Respiratory:  Negative for cough and shortness of breath.    Cardiovascular:  Negative for chest pain.   Gastrointestinal:  Negative for diarrhea, nausea and vomiting.   Neurological:  Negative for headaches.     Medical History Reviewed by provider this encounter:  Tobacco  Allergies  Meds  Problems  Med Hx  Surg Hx  Fam Hx     .    Objective   /80   Pulse 81   Temp (!) 96.9 °F (36.1 °C)   Ht 5' 2.5\" (1.588 m)   Wt 80.9 kg (178 lb 6.4 oz)   SpO2 100%   BMI 32.11 kg/m²     Physical Exam  Constitutional:       Appearance: Normal appearance.   HENT:      Head: Normocephalic.      Right Ear: External ear normal.      Left Ear: External ear normal.      Nose: Nose normal.      Mouth/Throat:      Mouth: Mucous membranes are moist.      Pharynx: Oropharynx is clear.   Eyes:      Conjunctiva/sclera: Conjunctivae normal.   Cardiovascular:      Rate and Rhythm: Normal rate and regular rhythm.      Heart sounds: Normal heart sounds.   Pulmonary:      Effort: Pulmonary effort is normal.      Breath sounds: Normal breath sounds.   Abdominal:      General: Bowel sounds are normal.      Palpations: Abdomen is soft.   Neurological:      Mental Status: She is alert and oriented to person, place, and time.   Psychiatric:         Behavior: Behavior normal.       "

## 2025-03-05 NOTE — PATIENT INSTRUCTIONS
"Patient Education     Routine physical for adults   The Basics   Written by the doctors and editors at [facility]   What is a physical? -- A physical is a routine visit, or \"check-up,\" with your doctor. You might also hear it called a \"wellness visit\" or \"preventive visit.\"  During each visit, the doctor will:   Ask about your physical and mental health   Ask about your habits, behaviors, and lifestyle   Do an exam   Give you vaccines if needed   Talk to you about any medicines you take   Give advice about your health   Answer your questions  Getting regular check-ups is an important part of taking care of your health. It can help your doctor find and treat any problems you have. But it's also important for preventing health problems.  A routine physical is different from a \"sick visit.\" A sick visit is when you see a doctor because of a health concern or problem. Since physicals are scheduled ahead of time, you can think about what you want to ask the doctor.  How often should I get a physical? -- It depends on your age and health. In general, for people age 21 years and older:   If you are younger than 50 years, you might be able to get a physical every 3 years.   If you are 50 years or older, your doctor might recommend a physical every year.  If you have an ongoing health condition, like diabetes or high blood pressure, your doctor will probably want to see you more often.  What happens during a physical? -- In general, each visit will include:   Physical exam - The doctor or nurse will check your height, weight, heart rate, and blood pressure. They will also look at your eyes and ears. They will ask about how you are feeling and whether you have any symptoms that bother you.   Medicines - It's a good idea to bring a list of all the medicines you take to each doctor visit. Your doctor will talk to you about your medicines and answer any questions. Tell them if you are having any side effects that bother you. You " "should also tell them if you are having trouble paying for any of your medicines.   Habits and behaviors - This includes:   Your diet   Your exercise habits   Whether you smoke, drink alcohol, or use drugs   Whether you are sexually active   Whether you feel safe at home  Your doctor will talk to you about things you can do to improve your health and lower your risk of health problems. They will also offer help and support. For example, if you want to quit smoking, they can give you advice and might prescribe medicines. If you want to improve your diet or get more physical activity, they can help you with this, too.   Lab tests, if needed - The tests you get will depend on your age and situation. For example, your doctor might want to check your:   Cholesterol   Blood sugar   Iron level   Vaccines - The recommended vaccines will depend on your age, health, and what vaccines you already had. Vaccines are very important because they can prevent certain serious or deadly infections.   Discussion of screening - \"Screening\" means checking for diseases or other health problems before they cause symptoms. Your doctor can recommend screening based on your age, risk, and preferences. This might include tests to check for:   Cancer, such as breast, prostate, cervical, ovarian, colorectal, prostate, lung, or skin cancer   Sexually transmitted infections, such as chlamydia and gonorrhea   Mental health conditions like depression and anxiety  Your doctor will talk to you about the different types of screening tests. They can help you decide which screenings to have. They can also explain what the results might mean.   Answering questions - The physical is a good time to ask the doctor or nurse questions about your health. If needed, they can refer you to other doctors or specialists, too.  Adults older than 65 years often need other care, too. As you get older, your doctor will talk to you about:   How to prevent falling at " home   Hearing or vision tests   Memory testing   How to take your medicines safely   Making sure that you have the help and support you need at home  All topics are updated as new evidence becomes available and our peer review process is complete.  This topic retrieved from [employer] on: May 02, 2024.  Topic 483567 Version 1.0  Release: 32.4.3 - C32.122  © 2024 UpToDate, Inc. and/or its affiliates. All rights reserved.  Consumer Information Use and Disclaimer   Disclaimer: This generalized information is a limited summary of diagnosis, treatment, and/or medication information. It is not meant to be comprehensive and should be used as a tool to help the user understand and/or assess potential diagnostic and treatment options. It does NOT include all information about conditions, treatments, medications, side effects, or risks that may apply to a specific patient. It is not intended to be medical advice or a substitute for the medical advice, diagnosis, or treatment of a health care provider based on the health care provider's examination and assessment of a patient's specific and unique circumstances. Patients must speak with a health care provider for complete information about their health, medical questions, and treatment options, including any risks or benefits regarding use of medications. This information does not endorse any treatments or medications as safe, effective, or approved for treating a specific patient. UpToDate, Inc. and its affiliates disclaim any warranty or liability relating to this information or the use thereof.The use of this information is governed by the Terms of Use, available at https://www.woltersHelishopteruwer.com/en/know/clinical-effectiveness-terms. 2024© UpToDate, Inc. and its affiliates and/or licensors. All rights reserved.  Copyright   © 2024 UpToDate, Inc. and/or its affiliates. All rights reserved.

## 2025-03-06 ENCOUNTER — OFFICE VISIT (OUTPATIENT)
Dept: PHYSICAL THERAPY | Facility: CLINIC | Age: 35
End: 2025-03-06
Payer: COMMERCIAL

## 2025-03-06 DIAGNOSIS — M54.9 UPPER BACK PAIN: Primary | ICD-10-CM

## 2025-03-06 PROCEDURE — 97110 THERAPEUTIC EXERCISES: CPT

## 2025-03-06 NOTE — PROGRESS NOTES
Daily Note     Today's date: 3/6/2025  Patient name: Milana De La Fuente  : 1990  MRN: [id number]  Referring provider: Bailey Strong,*  Dx:   Encounter Diagnosis     ICD-10-CM    1. Upper back pain  M54.9                      Subjective: HEP going well, no new complaint of pain      Objective: See treatment diary below      Assessment: Tolerated treatment well. Patient demonstrated fatigue post treatment and exhibited good technique with therapeutic exercises. Updated HEP issued       Plan: Continue per plan of care.      Precautions: breast augmentation 3.5 years ago      Date 3/3 3/6      FOTO 52 TS        Manuals        STM thoracic paraspinals TS  TS      Grade 3 and 4 PA thoracic spine mobs TS TS                      Neuro Re-Ed        Serratus activation at wall with roll  2/10      UBE reverse  4m L 1.5       No Money  Green tb      Horiz Abd   Green tb      CC row                         Ther Ex        Cat Camel 15 2/10      Open Book 15 10ea       Thoracic extension with roll  2/10                                               Ther Activity                        Gait Training                        Modalities

## 2025-03-11 ENCOUNTER — OFFICE VISIT (OUTPATIENT)
Dept: OTOLARYNGOLOGY | Facility: CLINIC | Age: 35
End: 2025-03-11
Payer: COMMERCIAL

## 2025-03-11 VITALS
TEMPERATURE: 98.4 F | DIASTOLIC BLOOD PRESSURE: 77 MMHG | HEIGHT: 63 IN | BODY MASS INDEX: 31.82 KG/M2 | RESPIRATION RATE: 16 BRPM | HEART RATE: 73 BPM | WEIGHT: 179.6 LBS | SYSTOLIC BLOOD PRESSURE: 121 MMHG | OXYGEN SATURATION: 98 %

## 2025-03-11 DIAGNOSIS — J35.8 TONSILLITH: ICD-10-CM

## 2025-03-11 DIAGNOSIS — K21.9 GASTROESOPHAGEAL REFLUX DISEASE, UNSPECIFIED WHETHER ESOPHAGITIS PRESENT: ICD-10-CM

## 2025-03-11 DIAGNOSIS — R49.9 CHANGE IN VOICE: ICD-10-CM

## 2025-03-11 DIAGNOSIS — J30.9 ALLERGIC RHINITIS, UNSPECIFIED SEASONALITY, UNSPECIFIED TRIGGER: Primary | ICD-10-CM

## 2025-03-11 PROCEDURE — 99244 OFF/OP CNSLTJ NEW/EST MOD 40: CPT | Performed by: PHYSICIAN ASSISTANT

## 2025-03-11 PROCEDURE — 31575 DIAGNOSTIC LARYNGOSCOPY: CPT | Performed by: PHYSICIAN ASSISTANT

## 2025-03-11 RX ORDER — AZELASTINE 1 MG/ML
2 SPRAY, METERED NASAL EVERY EVENING
Qty: 30 ML | Refills: 3 | Status: SHIPPED | OUTPATIENT
Start: 2025-03-11

## 2025-03-11 NOTE — PATIENT INSTRUCTIONS
Use flonase and take allegra every morning.  Use Azelastine every evening.  Take Pepcid 20mg in morning and evening.  Obtain allergy lab work.

## 2025-03-11 NOTE — PROGRESS NOTES
Consultation - Otolaryngology - Head and Neck Surgery  Milana De La Fuente 34 y.o. female MRN: [id number]  Encounter: [id number]        Assessment/Plan:  1. Allergic rhinitis, unspecified seasonality, unspecified trigger  Ambulatory Referral to Otolaryngology    azelastine (ASTELIN) 0.1 % nasal spray    [facility]ergy Panel, Adult      2. Change in voice  Ambulatory Referral to Otolaryngology      3. Gastroesophageal reflux disease, unspecified whether esophagitis present        4. Tonsillith            Patient presenting with chronic history of allergies. She is currently using Fluticasone and Allegra, and will begin daily use of Azelastine due to persistent rhinitis symptoms. She will obtain allergy lab work and I will contact her with results and follow up plan.    Use of OTC Waterpik was recommended for tonsil stones. Do not recommend tonsillectomy at this time.    Findings of flexible laryngoscopy were reviewed. No masses or lesions were visualized. The vocal cords appeared normal and mobile bilaterally. There was evidence of acid reflux so she will increase her Pepcid 20mg use to two times per day. We discussed consideration of speech therapy if her voice symptoms persist. May consider daily PPI if no improvement of acid reflux.    History of Present Illness   Physician Requesting Consult: Bailey Strong MD   Reason for Consult / Principal Problem: Allergies  HPI: Milana De La Fuente is a 34 y.o. year old female who presents for evaluation, referred by El Salgado PA-C. She reports chronic history of allergies for which she uses Fluticasone nasal spray and take Allegra. She did undergo skin testing last year by outside provider which was positive but was unable to follow up due to insurance issues. She continues to experience rhinitis symptoms. She does have occasional acid reflux, and takes Pepcid 20mg daily. She reports recent hoarseness of voice. She does use her voice frequently as a .  She previously had vocal cord nodule which resolved. She experiences occasional tonsil stones.    Review of systems:  10 Point ROS was performed and negative except as above or otherwise noted in the medical record.    Historical Information   Past Medical History:   Diagnosis Date    Allergic      Past Surgical History:   Procedure Laterality Date    BREAST IMPLANT       SECTION      x 2c sections     Social History   Social History     Substance and Sexual Activity   Alcohol Use Never     Social History     Substance and Sexual Activity   Drug Use Never     Social History     Tobacco Use   Smoking Status Never   Smokeless Tobacco Never       Current Outpatient Medications on File Prior to Visit   Medication Sig    cholecalciferol (VITAMIN D3) 400 units tablet Take 1 tablet (400 Units total) by mouth daily    famotidine (PEPCID) 20 mg tablet Take 20 mg by mouth 2 (two) times a day    Ferrous Sulfate (Iron) 325 (65 Fe) MG TABS Take 1 tablet (325 mg total) by mouth daily    phentermine 37.5 MG capsule Take 37.5 mg by mouth    topiramate (TOPAMAX) 25 mg tablet Take 1 tablet by mouth 2 (two) times a day     No current facility-administered medications on file prior to visit.       No Known Allergies    Vitals:    25 1315   BP: 121/77   Pulse: 73   Resp: 16   Temp: 98.4 °F (36.9 °C)   SpO2: 98%       Physical Exam   Constitutional: Oriented to person, place, and time. Well-developed and well-nourished, no apparent distress, non-toxic appearance. Cooperative, able to hear and answer questions without difficulty.    Voice: Voice quality is normal.  Head: Normocephalic, atraumatic. No scars, masses or lesions.  Face: Symmetric, no edema. Facial nerve function is symmetric/fully intact bilaterally.  Ears: External ears are normal bilaterally. Auditory canals are clear bilaterally. Tympanic membranes are clear bilaterally.  Nose: External nose is normal. Septum is midline. Turbinates are normal.  Oral cavity:  Dentition intact. Mucosa moist, lips without lesions or masses. Tongue mobile, floor of mouth soft and flat. Hard palate without masses or lesions.   Oropharynx: Uvula is midline. Soft palate without masses or lesions. Tonsils are 2+ and cryptic. Posterior pharynx shows post nasal drip.  Neck: Trachea is midline. No masses or lesions present. No palpable adenopathy. Thyroid is without tenderness or palpable nodules. Parotid and submandibular glands are normal.    Procedure Note: Flexible Laryngoscopy    Pre-operative Diagnosis: Change in Voice    Post-operative Diagnosis: Same    Informed Consent: Verbal    Endoscopy Type: Flexible Laryngoscopy    Procedure Details: The patient was placed in the sitting position in examination chair. Flexible fiberoptic scope was passed through nasal passage. The nasal cavities, nasopharynx, oropharynx, hypopharynx, and larynx were all examined. Vocal cords were examined during respiration and phonation. The following findings were noted:    Findings: Nasopharynx is clear. No BOT masses. Epiglottis is normal. Intra-arytenoid edema is present. Vocal cords are mobile bilaterally. No pooled secretions of pyriform sinus. No lesions visualized.    Condition: Stable. Patient tolerated procedure well.    Complications: None

## 2025-03-20 ENCOUNTER — OFFICE VISIT (OUTPATIENT)
Dept: URGENT CARE | Facility: CLINIC | Age: 35
End: 2025-03-20
Payer: COMMERCIAL

## 2025-03-20 VITALS
TEMPERATURE: 98.4 F | RESPIRATION RATE: 18 BRPM | HEART RATE: 98 BPM | OXYGEN SATURATION: 99 % | WEIGHT: 172 LBS | DIASTOLIC BLOOD PRESSURE: 94 MMHG | SYSTOLIC BLOOD PRESSURE: 150 MMHG | BODY MASS INDEX: 30.96 KG/M2

## 2025-03-20 DIAGNOSIS — R68.89 FLU-LIKE SYMPTOMS: Primary | ICD-10-CM

## 2025-03-20 PROCEDURE — 87636 SARSCOV2 & INF A&B AMP PRB: CPT

## 2025-03-20 PROCEDURE — S9088 SERVICES PROVIDED IN URGENT: HCPCS

## 2025-03-20 PROCEDURE — 99213 OFFICE O/P EST LOW 20 MIN: CPT

## 2025-03-20 RX ORDER — ONDANSETRON 4 MG/1
4 TABLET, FILM COATED ORAL EVERY 8 HOURS PRN
Qty: 20 TABLET | Refills: 0 | Status: SHIPPED | OUTPATIENT
Start: 2025-03-20

## 2025-03-20 NOTE — PROGRESS NOTES
[facility]        NAME: Milana De La Fuente is a 34 y.o. female  : 1990    MRN: [id number]  DATE: 2025  TIME: 3:55 PM    Assessment and Plan   Flu-like symptoms [R68.89]  1. Flu-like symptoms  Covid/Flu- Office Collect Normal    ondansetron (ZOFRAN) 4 mg tablet    Covid/Flu- Office Collect Normal        Suspect flu, swab sent.  Zofran for nausea. OTC treatments, fluids, and rest recommended    Patient Instructions   Take zofran as needed for nausea.  Lots of fluids and rest.  Follow up if symptoms worsen.    Follow up with PCP in 3-5 days.  Proceed to  ER if symptoms worsen.    If tests have been performed at [facility], our office will contact you with results if changes need to be made to the care plan discussed with you at the visit.  You can review your full results on [facility].    Chief Complaint     Chief Complaint   Patient presents with    Cough    Fever    Vomiting     Started 3 days ago  Body aces and diarrhea         History of Present Illness       34-year-old female presents with 3 days of vomiting, diarrhea, cough, fever.  She reports a Tmax of 101 last night.  She reports decreased appetite and she vomits when she tries to eat.  She is tolerating fluids.  She has been taking Tylenol and Robitussin with minimal relief.    Cough  Associated symptoms include a fever and a sore throat. Pertinent negatives include no chest pain, headaches, myalgias, shortness of breath or wheezing.   Fever  Associated symptoms include congestion, coughing, fatigue, a fever, nausea, a sore throat and vomiting. Pertinent negatives include no abdominal pain, chest pain, headaches or myalgias.   Vomiting   Associated symptoms include coughing, diarrhea and a fever. Pertinent negatives include no abdominal pain, chest pain, headaches or myalgias.       Review of Systems   Review of Systems   Constitutional:  Positive for appetite change, fatigue and fever.   HENT:  Positive for congestion and  sore throat.    Respiratory:  Positive for cough. Negative for shortness of breath and wheezing.    Cardiovascular:  Negative for chest pain.   Gastrointestinal:  Positive for diarrhea, nausea and vomiting. Negative for abdominal pain and blood in stool.   Musculoskeletal:  Negative for myalgias.   Neurological:  Negative for headaches.         Current Medications       Current Outpatient Medications:     azelastine (ASTELIN) 0.1 % nasal spray, 2 sprays into each nostril every evening Use in each nostril as directed, Disp: 30 mL, Rfl: 3    cholecalciferol (VITAMIN D3) 400 units tablet, Take 1 tablet (400 Units total) by mouth daily, Disp: 90 tablet, Rfl: 0    famotidine (PEPCID) 20 mg tablet, Take 20 mg by mouth 2 (two) times a day, Disp: , Rfl:     Ferrous Sulfate (Iron) 325 (65 Fe) MG TABS, Take 1 tablet (325 mg total) by mouth daily, Disp: 90 tablet, Rfl: 1    ondansetron (ZOFRAN) 4 mg tablet, Take 1 tablet (4 mg total) by mouth every 8 (eight) hours as needed for nausea or vomiting, Disp: 20 tablet, Rfl: 0    phentermine 37.5 MG capsule, Take 37.5 mg by mouth (Patient not taking: Reported on 3/20/2025), Disp: , Rfl:     topiramate (TOPAMAX) 25 mg tablet, Take 1 tablet by mouth 2 (two) times a day (Patient not taking: Reported on 3/20/2025), Disp: , Rfl:     Current Allergies     Allergies as of 2025    (No Known Allergies)            The following portions of the patient's history were reviewed and updated as appropriate: allergies, current medications, past family history, past medical history, past social history, past surgical history and problem list.     Past Medical History:   Diagnosis Date    Allergic        Past Surgical History:   Procedure Laterality Date    BREAST IMPLANT       SECTION      x 2c sections       Family History   Problem Relation Age of Onset    Diabetes Mother     Hyperlipidemia Father     Diabetes Brother     Diabetes Maternal Grandmother     Diabetes Maternal  Grandfather          Medications have been verified.        Objective   /94   Pulse 98   Temp 98.4 °F (36.9 °C)   Resp 18   Wt 78 kg (172 lb)   LMP 02/25/2025 (Approximate)   SpO2 99%   BMI 30.96 kg/m²   Patient's last menstrual period was 02/25/2025 (approximate).       Physical Exam     Physical Exam  Vitals and nursing note reviewed.   Constitutional:       General: She is not in acute distress.     Appearance: Normal appearance. She is not ill-appearing, toxic-appearing or diaphoretic.   HENT:      Head: Normocephalic and atraumatic.      Jaw: No trismus.      Right Ear: Tympanic membrane, ear canal and external ear normal.      Left Ear: Tympanic membrane, ear canal and external ear normal.      Nose: Congestion present.      Mouth/Throat:      Mouth: Mucous membranes are moist.      Pharynx: Oropharynx is clear. Uvula midline. Posterior oropharyngeal erythema and postnasal drip present. No pharyngeal swelling, oropharyngeal exudate or uvula swelling.      Tonsils: No tonsillar exudate or tonsillar abscesses. 0 on the right. 0 on the left.   Eyes:      Conjunctiva/sclera: Conjunctivae normal.   Cardiovascular:      Rate and Rhythm: Normal rate and regular rhythm.      Heart sounds: Normal heart sounds. No murmur heard.     No friction rub. No gallop.   Pulmonary:      Effort: Pulmonary effort is normal.      Breath sounds: Normal breath sounds. No stridor. No wheezing, rhonchi or rales.   Abdominal:      General: Abdomen is flat. Bowel sounds are normal.      Palpations: Abdomen is soft.      Tenderness: There is no abdominal tenderness.   Musculoskeletal:      Cervical back: No rigidity.   Lymphadenopathy:      Cervical: No cervical adenopathy.   Skin:     General: Skin is warm and dry.      Capillary Refill: Capillary refill takes less than 2 seconds.   Neurological:      General: No focal deficit present.      Mental Status: She is alert.   Psychiatric:         Mood and Affect: Mood normal.          Behavior: Behavior normal.

## 2025-03-21 ENCOUNTER — RESULTS FOLLOW-UP (OUTPATIENT)
Dept: URGENT CARE | Facility: CLINIC | Age: 35
End: 2025-03-21

## 2025-03-21 LAB
FLUAV RNA RESP QL NAA+PROBE: NEGATIVE
FLUBV RNA RESP QL NAA+PROBE: NEGATIVE
SARS-COV-2 RNA RESP QL NAA+PROBE: NEGATIVE

## 2025-03-27 ENCOUNTER — OFFICE VISIT (OUTPATIENT)
Age: 35
End: 2025-03-27
Payer: COMMERCIAL

## 2025-03-27 VITALS
HEIGHT: 62 IN | SYSTOLIC BLOOD PRESSURE: 130 MMHG | WEIGHT: 181 LBS | BODY MASS INDEX: 33.31 KG/M2 | DIASTOLIC BLOOD PRESSURE: 90 MMHG | HEART RATE: 86 BPM

## 2025-03-27 DIAGNOSIS — E66.811 OBESITY, CLASS I, BMI 30-34.9: Primary | ICD-10-CM

## 2025-03-27 PROCEDURE — 99243 OFF/OP CNSLTJ NEW/EST LOW 30: CPT | Performed by: PHYSICIAN ASSISTANT

## 2025-03-27 NOTE — PROGRESS NOTES
Assessment/Plan:  Milana was seen today for consult.    Diagnoses and all orders for this visit:    Obesity, Class I, BMI 30-34.9         - Discussed options of HealthyCORE-Intensive Lifestyle Intervention Program, Very Low Calorie Diet-VLCD, and Conservative Program and the role of weight loss medications.  - Explained the importance of making lifestyle changes first before starting anti-obesity medications.  - Patient should demonstrate lifestyle changes first before anti-obesity medication initiated.   - Patient is interested in pursuing Conservative Program  - Initial weight loss goal of 5-10% weight loss for improved health as studies have shown this is where we see the greatest impact on improving health and decreasing risk of obesity related conditions.  - Weight loss can improve patient's co-morbid conditions and/or prevent weight-related complications.  - Labs reviewed: As below.      General Recommendations:  Nutrition:  Eat breakfast daily.  Do not skip meals.     Food log (ie.) www.myfitnesspal.com, sparkpeople.com, loseit.com, calorieking.com, etc.    Practice mindful eating.  Be sure to set aside time to eat, eat slowly, and savor your food.    Hydration:    At least 64oz of water daily.  No sugar sweetened beverages.  No juice (eat the fruit instead).    Exercise:  Studies have shown that the ideal exercise goal is somewhere between 150 to 300 minutes of moderate intensity exercise a week.  Start with exercising 10 minutes every other day and gradually increase physical activity with a goal of at least 150 minutes of moderate intensity exercise a week, divided over at least 3 days a week.  An example of this would be exercising 30 minutes a day, 5 days a week.  Resistance training can increase muscle mass and increase our resting metabolic rate.   FULL BODY resistance training is recommended 2-3 times a week.  Do not do this on consecutive days to allow for muscle recovery.    Aim for a bare minimum  "5000 steps, even on days you do not exercise.    Monitoring:   Weigh yourself daily.  If this causes undue stress, then just weigh yourself once a week.  Weigh yourself the same time of the day with the same amount of clothing on.  Preferably this should be done after waking up, before you eat, and with no clothing or minimal clothing on.      Calorie goal:  2031-8371 snehal/day (Provided with meal plan to follow).  90 g of protein/day    Return visit:    Calorie tracking/deficit - calorie and protein goals reviewed  Physical activity goals - step count + resistance training goals reviewed  AOM - patient arrived 15 minutes late for her visit today. Will defer pharmacotherapy to follow up appointment  RTC: 2 months       Total time spent reviewing chart, interviewing patient, examining patient, discussing plan, answering all questions, and documentin min.       ______________________________________________________________________        Subjective:   Chief Complaint   Patient presents with    Consult     Pt is here for MWM consult. Waist - 32.5\"       HPI: Milana De La Fuente  is a 34 y.o. female with history of   excess weight, here to pursue weight loss management.  Previous notes and records have been reviewed.    Previously on phentermine + topamax    5 years ago - 190 lbs  Took phentermine + topamax  Got down to 150 lbs after 7 months  Liposuction and breast augment 4 years ago  Maintained for the first 2 years  Went back on phentermine which wasn't as effiecioua    Haivng back pain and going to PT    Exercise - just joined a gym. Cardio resistance program with       HPI  Wt Readings from Last 20 Encounters:   25 82.1 kg (181 lb)   25 78 kg (172 lb)   25 81.5 kg (179 lb 9.6 oz)   25 80.9 kg (178 lb 6.4 oz)   25 80.8 kg (178 lb 3.2 oz)   23 80.1 kg (176 lb 9.6 oz)          Food logging:  B: eggs (2)  + coffee/green juice  S: skip  L: rice + beans   S: skip  D: " "rice + beans + plaintains + meat (chicken/steak) + veggies (salads)  S: rice pudding -- craves sweet       Hydration: water        Coffee - 1 cup - milk + sugar        Green juice/giuseppe shots - 1/2 cup  Alcohol: none     Sleep: 5 hours      Occupation: 2 jobs, [employer] assistance +       Past Medical History:   Diagnosis Date    Allergic         Past Surgical History:   Procedure Laterality Date    BREAST IMPLANT       SECTION      x 2c sections     The following portions of the patient's history were reviewed and updated as appropriate: allergies, current medications, past family history, past medical history, past social history, past surgical history, and problem list.    Review Of Systems:  Review of Systems   Constitutional:  Negative for fatigue and fever.   HENT:  Negative for sore throat, trouble swallowing and voice change.    Respiratory:  Negative for shortness of breath.    Cardiovascular:  Negative for chest pain.   Gastrointestinal:  Negative for abdominal pain, constipation, diarrhea, nausea and vomiting.   Endocrine: Negative for cold intolerance and heat intolerance.   Genitourinary:  Negative for difficulty urinating.   Musculoskeletal:  Negative for arthralgias and back pain.   Psychiatric/Behavioral:  Negative for suicidal ideas. The patient is not nervous/anxious.    All other systems reviewed and are negative.      Objective:  /90 (Patient Position: Sitting, Cuff Size: Large)   Pulse 86   Ht 5' 2\" (1.575 m)   Wt 82.1 kg (181 lb)   LMP 2025 (Exact Date)   BMI 33.11 kg/m²   Physical Exam  Vitals and nursing note reviewed.   Constitutional:       General: She is not in acute distress.     Appearance: Normal appearance. She is obese. She is not ill-appearing, toxic-appearing or diaphoretic.   HENT:      Head: Normocephalic and atraumatic.   Eyes:      General:         Right eye: No discharge.         Left eye: No discharge.      Conjunctiva/sclera: " "Conjunctivae normal.   Pulmonary:      Effort: Pulmonary effort is normal. No respiratory distress.   Musculoskeletal:         General: Normal range of motion.      Cervical back: Normal range of motion. No rigidity.      Right lower leg: No edema.      Left lower leg: No edema.   Skin:     Coloration: Skin is not pale.      Findings: No erythema or rash.   Neurological:      General: No focal deficit present.      Mental Status: She is alert.   Psychiatric:         Mood and Affect: Mood normal.         Labs and Imaging  Recent labs and imaging have been personally reviewed.  Lab Results   Component Value Date    WBC 8.60 02/27/2025    HGB 14.1 02/27/2025    HCT 42.8 02/27/2025    MCV 94 02/27/2025     02/27/2025     Lab Results   Component Value Date    SODIUM 137 02/27/2025    K 4.1 02/27/2025     02/27/2025    CO2 24 02/27/2025    AGAP 7 02/27/2025    BUN 13 02/27/2025    CREATININE 0.79 02/27/2025    GLUC 83 04/08/2024    GLUF 94 02/27/2025    CALCIUM 9.7 02/27/2025    AST 16 02/27/2025    ALT 19 02/27/2025    ALKPHOS 77 02/27/2025    TP 8.0 02/27/2025    TBILI 0.44 02/27/2025    EGFR 98 02/27/2025     No results found for: \"HGBA1C\"  Lab Results   Component Value Date    GJQ5TWELLWPF 1.824 02/27/2025    TSH 1.37 09/16/2022     No results found for: \"CHOLESTEROL\"  No results found for: \"HDL\"  No results found for: \"TRIG\"  No results found for: \"LDLCALC\"    "

## 2025-04-09 ENCOUNTER — OFFICE VISIT (OUTPATIENT)
Dept: URGENT CARE | Facility: CLINIC | Age: 35
End: 2025-04-09
Payer: COMMERCIAL

## 2025-04-09 VITALS
TEMPERATURE: 98.6 F | SYSTOLIC BLOOD PRESSURE: 128 MMHG | OXYGEN SATURATION: 99 % | HEART RATE: 83 BPM | DIASTOLIC BLOOD PRESSURE: 85 MMHG | RESPIRATION RATE: 18 BRPM

## 2025-04-09 DIAGNOSIS — K29.00 ACUTE GASTRITIS WITHOUT HEMORRHAGE, UNSPECIFIED GASTRITIS TYPE: Primary | ICD-10-CM

## 2025-04-09 DIAGNOSIS — R19.7 DIARRHEA, UNSPECIFIED TYPE: ICD-10-CM

## 2025-04-09 PROCEDURE — 99213 OFFICE O/P EST LOW 20 MIN: CPT

## 2025-04-09 PROCEDURE — S9088 SERVICES PROVIDED IN URGENT: HCPCS

## 2025-04-09 RX ORDER — OMEPRAZOLE 40 MG/1
40 CAPSULE, DELAYED RELEASE ORAL DAILY
Qty: 14 CAPSULE | Refills: 0 | Status: SHIPPED | OUTPATIENT
Start: 2025-04-09

## 2025-04-09 RX ORDER — ONDANSETRON 4 MG/1
4 TABLET, FILM COATED ORAL EVERY 8 HOURS PRN
Qty: 9 TABLET | Refills: 0 | Status: SHIPPED | OUTPATIENT
Start: 2025-04-09 | End: 2025-04-12

## 2025-04-09 RX ORDER — AZITHROMYCIN 250 MG/1
TABLET, FILM COATED ORAL
Qty: 6 TABLET | Refills: 0 | Status: SHIPPED | OUTPATIENT
Start: 2025-04-09 | End: 2025-04-13

## 2025-04-09 NOTE — PROGRESS NOTES
[facility]        NAME: Milana De La Fuente is a 34 y.o. female  : 1990    MRN: [id number]  DATE: 2025  TIME: 4:44 PM    Assessment and Plan   Acute gastritis without hemorrhage, unspecified gastritis type [K29.00]  1. Acute gastritis without hemorrhage, unspecified gastritis type  omeprazole (PriLOSEC) 40 MG capsule    ondansetron (ZOFRAN) 4 mg tablet      2. Diarrhea, unspecified type  azithromycin (ZITHROMAX) 250 mg tablet      CT scan on  was negative  Has been taking pepcid without relief  Generalized epigastric abdominal tenderness  Patient Instructions   Take zofran as prescribed  Fluids (pedialyte) and rest  Broths and clear soups  BRAT diet (bananas, rice, applesauce, and toast)  Progress to normal diet as tolerated  Avoid dairy while symptoms persist  Tylenol for pain/fever      Follow up with PCP in 3-5 days.  Proceed to  ER if symptoms worsen.    If tests are performed, our office will contact you with results only if changes need to made to the care plan discussed with you at the visit. [facility]    Chief Complaint     Chief Complaint   Patient presents with    Abdominal Pain     .mid epigastric pain with nausea and vomiting onset 1 week ago seen in local er on 25 for same not any better today was given script for zofran but did not get it filled          History of Present Illness       Patient presnts with abdominal pain, nausea and vomiting for one week. She reports 4 days of diarrhea 4-5 times per day. Denies travel out of country. Has been seen 3 times in last month for abdominal pain and nausea. CT scan on  was negative and did not get zofran filled        Review of Systems   Review of Systems   Gastrointestinal:  Positive for abdominal pain, nausea and vomiting.   All other systems reviewed and are negative.        Current Medications       Current Outpatient Medications:     azithromycin (ZITHROMAX) 250 mg tablet,  Take 2 tablets today then 1 tablet daily x 4 days, Disp: 6 tablet, Rfl: 0    omeprazole (PriLOSEC) 40 MG capsule, Take 1 capsule (40 mg total) by mouth daily, Disp: 14 capsule, Rfl: 0    ondansetron (ZOFRAN) 4 mg tablet, Take 1 tablet (4 mg total) by mouth every 8 (eight) hours as needed for vomiting or nausea for up to 3 days, Disp: 9 tablet, Rfl: 0    azelastine (ASTELIN) 0.1 % nasal spray, 2 sprays into each nostril every evening Use in each nostril as directed, Disp: 30 mL, Rfl: 3    cholecalciferol (VITAMIN D3) 400 units tablet, Take 1 tablet (400 Units total) by mouth daily, Disp: 90 tablet, Rfl: 0    famotidine (PEPCID) 20 mg tablet, Take 20 mg by mouth 2 (two) times a day, Disp: , Rfl:     Ferrous Sulfate (Iron) 325 (65 Fe) MG TABS, Take 1 tablet (325 mg total) by mouth daily, Disp: 90 tablet, Rfl: 1    ondansetron (ZOFRAN) 4 mg tablet, Take 1 tablet (4 mg total) by mouth every 8 (eight) hours as needed for nausea or vomiting (Patient not taking: Reported on 3/27/2025), Disp: 20 tablet, Rfl: 0    Current Allergies     Allergies as of 2025    (No Known Allergies)            The following portions of the patient's history were reviewed and updated as appropriate: allergies, current medications, past family history, past medical history, past social history, past surgical history and problem list.     Past Medical History:   Diagnosis Date    Allergic        Past Surgical History:   Procedure Laterality Date    BREAST IMPLANT       SECTION      x 2c sections       Family History   Problem Relation Age of Onset    Diabetes Mother     Hyperlipidemia Father     Diabetes Brother     Diabetes Maternal Grandmother     Diabetes Maternal Grandfather          Medications have been verified.        Objective   /85   Pulse 83   Temp 98.6 °F (37 °C)   Resp 18   LMP 2025 (Exact Date)   SpO2 99%        Physical Exam     Physical Exam  Vitals and nursing note reviewed.   Constitutional:        Appearance: Normal appearance.   HENT:      Head: Normocephalic.   Eyes:      Pupils: Pupils are equal, round, and reactive to light.   Cardiovascular:      Rate and Rhythm: Normal rate and regular rhythm.      Heart sounds: Normal heart sounds. No murmur heard.  Pulmonary:      Effort: Pulmonary effort is normal. No respiratory distress.      Breath sounds: Normal breath sounds. No wheezing, rhonchi or rales.   Abdominal:      General: Bowel sounds are normal.      Palpations: Abdomen is soft.      Tenderness: There is generalized abdominal tenderness. There is no right CVA tenderness, left CVA tenderness, guarding or rebound. Negative signs include Garcia's sign.   Musculoskeletal:         General: Tenderness present.      Cervical back: Normal range of motion. No tenderness.   Neurological:      General: No focal deficit present.      Mental Status: She is alert and oriented to person, place, and time.      Sensory: No sensory deficit.      Motor: No weakness.      Coordination: Coordination normal.      Gait: Gait normal.      Deep Tendon Reflexes: Reflexes normal.

## 2025-04-09 NOTE — LETTER
April 9, 2025     Patient: Milana De La Fuente   YOB: 1990   Date of Visit: 4/9/2025       To Whom it May Concern:    Milana De La Fuente was seen in my clinic on 4/9/2025. She may return to work on 4/11/25 .    If you have any questions or concerns, please don't hesitate to call.         Sincerely,          Angela Lombardo, CRNP        CC: No Recipients

## 2025-04-09 NOTE — PATIENT INSTRUCTIONS
Take zofran as prescribed  Fluids (pedialyte) and rest  Broths and clear soups  BRAT diet (bananas, rice, applesauce, and toast)  Progress to normal diet as tolerated  Avoid dairy while symptoms persist  Tylenol for pain/fever      Follow up with PCP in 3-5 days.  Proceed to  ER if symptoms worsen.    If tests are performed, our office will contact you with results only if changes need to made to the care plan discussed with you at the visit. You can review your full results on [facility] My[facility].

## 2025-04-24 ENCOUNTER — OFFICE VISIT (OUTPATIENT)
Dept: FAMILY MEDICINE CLINIC | Facility: CLINIC | Age: 35
End: 2025-04-24
Payer: COMMERCIAL

## 2025-04-24 VITALS
SYSTOLIC BLOOD PRESSURE: 114 MMHG | DIASTOLIC BLOOD PRESSURE: 78 MMHG | WEIGHT: 179 LBS | BODY MASS INDEX: 32.94 KG/M2 | OXYGEN SATURATION: 97 % | HEART RATE: 75 BPM | HEIGHT: 62 IN | TEMPERATURE: 97.2 F

## 2025-04-24 DIAGNOSIS — E61.1 IRON DEFICIENCY: Primary | ICD-10-CM

## 2025-04-24 DIAGNOSIS — K29.00 ACUTE GASTRITIS WITHOUT HEMORRHAGE, UNSPECIFIED GASTRITIS TYPE: ICD-10-CM

## 2025-04-24 PROCEDURE — 99214 OFFICE O/P EST MOD 30 MIN: CPT | Performed by: PHYSICIAN ASSISTANT

## 2025-04-24 RX ORDER — ONDANSETRON 4 MG/1
4 TABLET, FILM COATED ORAL EVERY 8 HOURS PRN
Qty: 9 TABLET | Refills: 0 | Status: SHIPPED | OUTPATIENT
Start: 2025-04-24 | End: 2025-04-27

## 2025-04-24 RX ORDER — OMEPRAZOLE 40 MG/1
40 CAPSULE, DELAYED RELEASE ORAL DAILY
Qty: 14 CAPSULE | Refills: 0 | Status: SHIPPED | OUTPATIENT
Start: 2025-04-24

## 2025-04-24 NOTE — PROGRESS NOTES
Name: Milana De La Fuente      : 1990      MRN: [id number]  Encounter Provider: El Salgado PA-C  Encounter Date: 2025   Encounter department: [facility]  :  Assessment & Plan  Acute gastritis without hemorrhage, unspecified gastritis type     - Pt w/ ongoing abdominal pain x 3 weeks    - Wax/wanes, has features of intermittent diarrhea, constipation, nausea, vomiting    - Was seen in ED on 2 separate occasions where basic labs were collected along w/ abdominal CT that were all WNL    - Patient states zofran has been helpful and has not started omeprazole    - Pain is intermittent, aching, located to epigastric region    - Pain does not radiate, denies dysuria, blood in urine or stool    - Physical exam WNL    - Patient would like referral to GI which PCP agreeable to    - Given chronicity of symptoms PCP to collect stool tests to r/o infectious or inflammatory cause. Advised patient not to begin omeprazole until she submits stool sample    - Return in 4 weeks to recheck  Orders:    omeprazole (PriLOSEC) 40 MG capsule; Take 1 capsule (40 mg total) by mouth daily    ondansetron (ZOFRAN) 4 mg tablet; Take 1 tablet (4 mg total) by mouth every 8 (eight) hours as needed for vomiting or nausea for up to 3 days    H. pylori antigen, stool; Future    Stool Enteric Bacterial Panel by PCR; Future    Calprotectin,Fecal; Future    Ambulatory Referral to Gastroenterology; Future    Iron deficiency     -recheck  Orders:    Iron Panel (Includes Ferritin, Iron Sat%, Iron, and TIBC); Future           History of Present Illness   Abdominal Pain  This is a chronic problem. Episode onset: 3 weeks ago. The onset quality is gradual. The problem occurs 2 to 4 times per day. The problem has been waxing and waning. The pain is located in the epigastric region. The pain is at a severity of 6/10. The pain is moderate. The quality of the pain is aching. The abdominal pain does not radiate. Associated  "symptoms include constipation, diarrhea, nausea and vomiting. Pertinent negatives include no dysuria, fever, headaches, hematochezia or hematuria. The pain is aggravated by eating (spicy foods). The pain is relieved by Bowel movements. She has tried H2 blockers for the symptoms. The treatment provided no relief. Prior diagnostic workup includes CT scan. Her past medical history is significant for GERD. There is no history of abdominal surgery, irritable bowel syndrome, pancreatitis or PUD.     Review of Systems   Constitutional:  Negative for fatigue and fever.   Respiratory:  Negative for cough and shortness of breath.    Cardiovascular:  Negative for chest pain.   Gastrointestinal:  Positive for abdominal pain, constipation, diarrhea, nausea and vomiting. Negative for hematochezia.   Genitourinary:  Negative for dysuria and hematuria.   Neurological:  Negative for headaches.       Objective   /78 (BP Location: Right arm, Patient Position: Sitting, Cuff Size: Adult)   Pulse 75   Temp (!) 97.2 °F (36.2 °C) (Tympanic)   Ht 5' 2\" (1.575 m)   Wt 81.2 kg (179 lb)   LMP 03/23/2025 (Exact Date)   SpO2 97%   BMI 32.74 kg/m²      Physical Exam  Constitutional:       Appearance: Normal appearance.   HENT:      Head: Normocephalic.      Right Ear: External ear normal.      Left Ear: External ear normal.      Nose: Nose normal.      Mouth/Throat:      Mouth: Mucous membranes are moist.      Pharynx: Oropharynx is clear.   Eyes:      Conjunctiva/sclera: Conjunctivae normal.   Cardiovascular:      Rate and Rhythm: Normal rate and regular rhythm.      Heart sounds: Normal heart sounds.   Pulmonary:      Effort: Pulmonary effort is normal.      Breath sounds: Normal breath sounds.   Abdominal:      General: Bowel sounds are normal. There is no distension.      Palpations: Abdomen is soft.      Tenderness: There is no abdominal tenderness. There is no right CVA tenderness, left CVA tenderness, guarding or rebound. "   Neurological:      Mental Status: She is alert and oriented to person, place, and time.   Psychiatric:         Behavior: Behavior normal.

## 2025-04-24 NOTE — LETTER
April 24, 2025     Patient: Milana De La Fuente  YOB: 1990  Date of Visit: 4/24/2025      To Whom it May Concern:    Milana De La Fuente is under my professional care. Milana was seen in my office on 4/24/2025. Milana has been dealing with chronic condition that has created episodic flares causing her to miss time due to emergency room, urgent care visits. I am currently working up patient for issue and also referring her to gastroenterology to get her chronic condition under control. I have provided [facility] paperwork requesting that she is allowed up to 3 days off as needed per month as we work to get chronic condition under control. Please excuse absences previous to this date as patient was seen in urgent/emergent facilities seeking care.     If you have any questions or concerns, please don't hesitate to call.         Sincerely,          El Salgado PA-C        CC: No Recipients

## 2025-04-28 ENCOUNTER — APPOINTMENT (OUTPATIENT)
Dept: LAB | Facility: CLINIC | Age: 35
End: 2025-04-28
Attending: PHYSICIAN ASSISTANT
Payer: COMMERCIAL

## 2025-04-28 DIAGNOSIS — E61.1 IRON DEFICIENCY: ICD-10-CM

## 2025-04-28 DIAGNOSIS — J30.9 ALLERGIC RHINITIS, UNSPECIFIED SEASONALITY, UNSPECIFIED TRIGGER: ICD-10-CM

## 2025-04-28 LAB
FERRITIN SERPL-MCNC: 16 NG/ML (ref 30–307)
IRON SATN MFR SERPL: 32 % (ref 15–50)
IRON SERPL-MCNC: 108 UG/DL (ref 50–212)
TIBC SERPL-MCNC: 333.2 UG/DL (ref 250–450)
TRANSFERRIN SERPL-MCNC: 238 MG/DL (ref 203–362)
UIBC SERPL-MCNC: 225 UG/DL (ref 155–355)

## 2025-04-28 PROCEDURE — 36415 COLL VENOUS BLD VENIPUNCTURE: CPT

## 2025-04-28 PROCEDURE — 83550 IRON BINDING TEST: CPT

## 2025-04-28 PROCEDURE — 82728 ASSAY OF FERRITIN: CPT

## 2025-04-28 PROCEDURE — 83540 ASSAY OF IRON: CPT

## 2025-04-28 PROCEDURE — 82785 ASSAY OF IGE: CPT

## 2025-04-28 PROCEDURE — 86003 ALLG SPEC IGE CRUDE XTRC EA: CPT

## 2025-04-29 LAB
A ALTERNATA IGE QN: <0.1 KUA/I (ref 0–0.1)
A FUMIGATUS IGE QN: <0.1 KUA/I (ref 0–0.1)
BERMUDA GRASS IGE QN: <0.1 KUA/I (ref 0–0.1)
BOXELDER IGE QN: <0.1 KUA/I (ref 0–0.1)
C HERBARUM IGE QN: <0.1 KUA/I (ref 0–0.1)
CAT DANDER IGE QN: <0.1 KUA/I (ref 0–0.1)
CMN PIGWEED IGE QN: <0.1 KUA/I (ref 0–0.1)
COMMON RAGWEED IGE QN: 4.68 KUA/I (ref 0–0.1)
COTTONWOOD IGE QN: <0.1 KUA/I (ref 0–0.1)
D FARINAE IGE QN: 31.9 KUA/I (ref 0–0.1)
D PTERONYSS IGE QN: 24.8 KUA/I (ref 0–0.1)
DOG DANDER IGE QN: 1.28 KUA/I (ref 0–0.1)
LONDON PLANE IGE QN: <0.1 KUA/I (ref 0–0.1)
MOUSE URINE PROT IGE QN: <0.1 KUA/I (ref 0–0.1)
MT JUNIPER IGE QN: <0.1 KUA/I (ref 0–0.1)
MUGWORT IGE QN: 6.01 KUA/I (ref 0–0.1)
P NOTATUM IGE QN: <0.1 KUA/I (ref 0–0.1)
ROACH IGE QN: 2.42 KUA/I (ref 0–0.1)
SHEEP SORREL IGE QN: <0.1 KUA/I (ref 0–0.1)
SILVER BIRCH IGE QN: 0.11 KUA/I (ref 0–0.1)
TIMOTHY IGE QN: <0.1 KUA/I (ref 0–0.1)
TOTAL IGE SMQN RAST: 1341 KU/L (ref 0–113)
WALNUT IGE QN: <0.1 KUA/I (ref 0–0.1)
WHITE ASH IGE QN: 0.27 KUA/I (ref 0–0.1)
WHITE ELM IGE QN: 0.12 KUA/I (ref 0–0.1)
WHITE MULBERRY IGE QN: <0.1 KUA/I (ref 0–0.1)
WHITE OAK IGE QN: <0.1 KUA/I (ref 0–0.1)

## 2025-05-01 ENCOUNTER — TELEPHONE (OUTPATIENT)
Dept: PODIATRY | Facility: CLINIC | Age: 35
End: 2025-05-01

## 2025-05-01 DIAGNOSIS — R79.0 LOW FERRITIN: Primary | ICD-10-CM

## 2025-05-19 ENCOUNTER — CONSULT (OUTPATIENT)
Dept: GASTROENTEROLOGY | Facility: CLINIC | Age: 35
End: 2025-05-19
Attending: PHYSICIAN ASSISTANT

## 2025-05-19 VITALS
HEIGHT: 62 IN | DIASTOLIC BLOOD PRESSURE: 84 MMHG | OXYGEN SATURATION: 96 % | HEART RATE: 74 BPM | TEMPERATURE: 97.6 F | BODY MASS INDEX: 34.04 KG/M2 | SYSTOLIC BLOOD PRESSURE: 125 MMHG | WEIGHT: 185 LBS

## 2025-05-19 DIAGNOSIS — R10.13 EPIGASTRIC PAIN: Primary | ICD-10-CM

## 2025-05-19 DIAGNOSIS — K29.00 ACUTE GASTRITIS WITHOUT HEMORRHAGE, UNSPECIFIED GASTRITIS TYPE: ICD-10-CM

## 2025-05-19 DIAGNOSIS — E61.1 IRON DEFICIENCY: ICD-10-CM

## 2025-05-19 DIAGNOSIS — R19.4 CHANGE IN BOWEL HABITS: ICD-10-CM

## 2025-05-19 RX ORDER — OMEPRAZOLE 40 MG/1
40 CAPSULE, DELAYED RELEASE ORAL DAILY
Qty: 14 CAPSULE | Refills: 0 | Status: SHIPPED | OUTPATIENT
Start: 2025-05-19 | End: 2025-05-19

## 2025-05-19 RX ORDER — OMEPRAZOLE 40 MG/1
40 CAPSULE, DELAYED RELEASE ORAL DAILY
Qty: 30 CAPSULE | Refills: 0 | Status: SHIPPED | OUTPATIENT
Start: 2025-05-19 | End: 2025-06-18

## 2025-05-19 NOTE — PROGRESS NOTES
Name: Milana De La Fuente      : 1990      MRN: [id number]  Encounter Provider: Dora Delatorre PA-C  Encounter Date: 2025   Encounter department: [facility] [place]  Assessment & Plan  Epigastric pain  Acute gastritis without hemorrhage, unspecified gastritis type  Patient had been dealing with severe epigastric pain 3/2025 and 2025.  The precipitant is unclear, she may have been ill with a viral syndrome around that time and she also had tried GLP-1 agonist at that time though discontinued the medication after 1 usage.  She was in the emergency department and did have 3D imaging completed which was unremarkable.  She has been taking famotidine intermittently with partial improvement in her symptoms.  Examination is benign today.  We will start with noninvasive investigation to include an H. pylori stool test and ultrasound to evaluate for biliary pathology that may have been missed on imaging.  She should subsequently initiate PPI daily after dropping off the stool test.    Recommend diet and lifestyle modifications for GERD.  This includes avoiding spicy, saucy, greasy/oily foods, citrus, EtOH, NSAIDs, tobacco.  Avoid eating within 2 to 3 hours of bed.  Elevating height of bed 6 inches on blocks may be beneficial.  Weight loss would likely be beneficial.    If she fails to improve, an upper endoscopy would then be pursued.  Orders:    US abdomen complete; Future    Giardia antigen; Future    Ova and parasite examination; Future    omeprazole (PriLOSEC) 40 MG capsule; Take 1 capsule (40 mg total) by mouth in the morning    IgA; Future    Tissue Transglutaminase, IgA; Future    Change in bowel habits  Patient had also noted change in stool output from time of onset of symptoms.  She is dealing with postprandial loose stool.  Her PCP had ordered stool testing to include H. pylori, enteric panel, and a calprotectin.  A few infectious studies were added and I encouraged  her to have these completed.  We will check for celiac disease as well.  Recommend daily fiber supplement to add bulk and formed to her stool.  No alarm features at this time that would warrant colonoscopy, though if symptoms fail to improve or she has an elevated calprotectin we may need to consider.  Orders:    Giardia antigen; Future    Ova and parasite examination; Future    Iron deficiency  Patient was noted to have an iron deficiency without anemia.  She has been on oral iron with improvement in her iron panel.  Should she develop progressive iron deficiency and/or iron deficiency anemia, bidirectional endoscopic evaluation would then be recommended.  We will continue to monitor her serologic testing.       We will follow up in 3 months to reassess symptoms.  Patient should reach out to clinic if any new or worsening symptoms arise.    History of Present Illness   Milana De La Fuente is a 34 y.o. female who presents for hospital f/u for abdominal pain. Pmhx sig for iron def, allergies, BMI 34.   HPI  History obtained from: patient    Pt is new me. She was evaluated several times within the past 2 months for a constellation of GI symptoms to include epigastric pain, nausea and vomiting.  Her first evaluation was on 3/20/2025 when she presented to the urgent care with flulike symptoms, she was dealing with decreased appetite and nonbloody emesis at that time.  She tested negative for COVID and flu.  She was subsequently evaluated in the ER x 2 in 04/2025.  She had CT completed which was unremarkable. She was discharged on H2RA, PPI, and anti-emetics.     05/20/25:     Pt shares that she did try GLP-1 agonist around the onset of symptoms and wonders if this could have been the precipitant though she discontinued the medication and despite this was continuing to deal with recurrent symptoms of abdominal pain and nausea.  She describes some intermittent burning primarily in the epigastrium.  Does not radiate.   "Symptoms seem to worsen postprandially.  Also notes some sense of regurgitation at times.  No dysphagia or odynophagia.  No hematemesis.  No abnormal weight loss over the past 6 months.    Pt has also been dealing with loose stools. Typically post-prandially. Can be loose to watery. No BRBPR, melena, or steatorrhea. No recent abx. No nocturnal BM. No abd pain or rectal pain related to defecation. No family hx of CRC.     No recurrent rashes, joint erythema, recurrent mouth ulcers, recurrent eye infections.     NSAIDs: no regular use   ETOh: rare  Tobacco: hookah PRN  Cannabis: none    2025: CT A/P: wnl  2025: Hb 13.5, MCV 92, Plt 303, BUN 14, Cr 0.75, AST 14, ALT 16, albumin 4.5, ALP 70, lipase 19, TSAT 32, Iron 108, ferritin 16     Review of Systems A complete review of systems is negative other than that noted above in the HPI.    Past Medical History   Past Medical History:   Diagnosis Date    Allergic      Past Surgical History:   Procedure Laterality Date    BREAST IMPLANT       SECTION      x 2c sections     Family History   Problem Relation Age of Onset    Diabetes Mother     Hyperlipidemia Father     Diabetes Brother     Diabetes Maternal Grandmother     Diabetes Maternal Grandfather       reports that she has never smoked. She has never used smokeless tobacco. She reports that she does not drink alcohol and does not use drugs.  Current Outpatient Medications   Medication Instructions    azelastine (ASTELIN) 0.1 % nasal spray 2 sprays, Nasal, Every evening, Use in each nostril as directed    cholecalciferol (VITAMIN D3) 400 Units, Oral, Daily    famotidine (PEPCID) 20 mg, 2 times daily    Iron 325 mg, Oral, Daily    montelukast (SINGULAIR) 10 mg, Oral, Daily at bedtime    omeprazole (PRILOSEC) 40 mg, Oral, Daily   Allergies[1]      Objective   /84 (BP Location: Right arm, Patient Position: Sitting, Cuff Size: Standard)   Pulse 74   Temp 97.6 °F (36.4 °C) (Temporal)   Ht 5' 2\" (1.575 m) "   Wt 83.9 kg (185 lb)   SpO2 96%   BMI 33.84 kg/m²     Physical Exam  Vitals and nursing note reviewed.   Constitutional:       General: She is not in acute distress.     Appearance: She is well-developed.   HENT:      Head: Normocephalic and atraumatic.     Eyes:      General: No scleral icterus.     Conjunctiva/sclera: Conjunctivae normal.       Cardiovascular:      Rate and Rhythm: Normal rate.   Pulmonary:      Effort: Pulmonary effort is normal. No respiratory distress.   Abdominal:      General: There is no distension.      Palpations: Abdomen is soft.      Tenderness: There is no abdominal tenderness. There is no guarding.     Skin:     General: Skin is warm and dry.      Coloration: Skin is not jaundiced.     Neurological:      General: No focal deficit present.      Mental Status: She is alert.     Psychiatric:         Mood and Affect: Mood normal.         Behavior: Behavior normal.        Lab Results: I personally reviewed relevant lab results. CBC/BMP: No new results in last 24 hours. , Creatinine Clearance: CrCl cannot be calculated (Patient's most recent lab result is older than the maximum 7 days allowed.)., LFTs: No new results in last 24 hours.     Radiology Results Review: I have reviewed radiology reports from [facility] including: CT abdomen/pelvis and Ultrasound(s).    **Please note:  Dictation voice to text software may have been used in the creation of this record.  Occasional wrong word or “sound alike” substitutions may have occurred due to the inherent limitations of voice recognition software.  Read the chart carefully and recognize, using context, where substitutions have occurred.**         [1] No Known Allergies

## 2025-05-19 NOTE — PATIENT INSTRUCTIONS
Stay on famotidine twice daily until you drop off the stool samples.   Then, start a stronger medication for stomach inflammation called omeprazole.   Take this every morning.     In general, dairy, gluten and processed sugars can be tough on the GI tract.  Have blood work completed.   You can try benefiber powder to help add bulk/form to stool.    Ultrasound to look for more gallbladder problems.     If symptoms persist, we may need to consider EGD and colonoscopy.

## 2025-05-23 ENCOUNTER — TELEPHONE (OUTPATIENT)
Dept: PULMONOLOGY | Facility: CLINIC | Age: 35
End: 2025-05-23

## 2025-05-27 ENCOUNTER — HOSPITAL ENCOUNTER (OUTPATIENT)
Dept: ULTRASOUND IMAGING | Facility: HOSPITAL | Age: 35
Discharge: HOME | End: 2025-05-27
Attending: PHYSICIAN ASSISTANT
Payer: COMMERCIAL

## 2025-05-27 DIAGNOSIS — K29.00 ACUTE GASTRITIS WITHOUT HEMORRHAGE, UNSPECIFIED GASTRITIS TYPE: ICD-10-CM

## 2025-05-27 DIAGNOSIS — R10.13 EPIGASTRIC PAIN: ICD-10-CM

## 2025-05-27 PROCEDURE — 76700 US EXAM ABDOM COMPLETE: CPT

## 2025-06-04 ENCOUNTER — RESULTS FOLLOW-UP (OUTPATIENT)
Dept: GASTROENTEROLOGY | Facility: CLINIC | Age: 35
End: 2025-06-04

## 2025-06-05 NOTE — TELEPHONE ENCOUNTER
As requested emailed FMLA.  Patient was encouraged to call the office if symptoms persist.  She was also informed FMLA will not be covered by Gastro[facility] after 6/5/2025.

## 2025-06-05 NOTE — TELEPHONE ENCOUNTER
SPOKE WITH PT, CALLING TO SEE IF FORMS CAN BE FILLED OUT TO COVER DAYS SHE WAS ABSENT FROM WORK. PT REQUESTING A RETURN CALL. THANK YOU.

## 2025-06-05 NOTE — TELEPHONE ENCOUNTER
The forms were completed to the best of our capabilities.  If patient is continuing to have symptoms despite treatment with the omeprazole I would then recommend she proceed with an upper endoscopy to evaluate for potential sources of her symptoms.  We did review in the office that this would be the next step if symptoms did not improve.    If she is agreeable, please let me know and I will order endoscopy.

## 2025-06-24 ENCOUNTER — OFFICE VISIT (OUTPATIENT)
Dept: URGENT CARE | Facility: CLINIC | Age: 35
End: 2025-06-24
Payer: COMMERCIAL

## 2025-06-24 VITALS
WEIGHT: 177 LBS | BODY MASS INDEX: 32.57 KG/M2 | OXYGEN SATURATION: 95 % | HEIGHT: 62 IN | SYSTOLIC BLOOD PRESSURE: 127 MMHG | DIASTOLIC BLOOD PRESSURE: 76 MMHG | TEMPERATURE: 98.7 F | RESPIRATION RATE: 16 BRPM | HEART RATE: 70 BPM

## 2025-06-24 DIAGNOSIS — N61.1 BREAST ABSCESS OF FEMALE: Primary | ICD-10-CM

## 2025-06-24 DIAGNOSIS — T85.9XXA COMPLICATION ASSOCIATED WITH SILICONE GEL-FILLED BREAST IMPLANT: ICD-10-CM

## 2025-06-24 PROCEDURE — S9088 SERVICES PROVIDED IN URGENT: HCPCS | Performed by: PHYSICIAN ASSISTANT

## 2025-06-24 PROCEDURE — 99213 OFFICE O/P EST LOW 20 MIN: CPT | Performed by: PHYSICIAN ASSISTANT

## 2025-06-24 RX ORDER — CEPHALEXIN 500 MG/1
500 CAPSULE ORAL 3 TIMES DAILY
Qty: 15 CAPSULE | Refills: 0 | Status: SHIPPED | OUTPATIENT
Start: 2025-06-24 | End: 2025-06-29

## 2025-06-24 NOTE — PROGRESS NOTES
[facility]        NAME: Milana De La Fuente is a 34 y.o. female  : 1990    MRN: [id number]  DATE: 2025  TIME: 7:37 PM    Assessment and Plan   Breast abscess of female [N61.1]  1. Breast abscess of female  Ambulatory Referral to Plastic Surgery    cephalexin (KEFLEX) 500 mg capsule      2. Complication associated with silicone gel-filled breast implant  Ambulatory Referral to Plastic Surgery            Patient Instructions    TAKE antibiotics as prescribed.  Wet a clean washcloth with warm water, and put it on your abscess.   When the washcloth cools, reheat it with warm water and put it back.   Repeat these steps for 10 to 15 minutes every few hours.   Wash your hands before and after you touch.  Follow-up with the plastic surgeon for further assessment and management.    Follow up with PCP in 3-5 days.  Proceed to  ER if symptoms worsen.    If tests have been performed at [facility], our office will contact you with results if changes need to be made to the care plan discussed with you at the visit.  You can review your full results on [facility] MyChart.    Chief Complaint     Chief Complaint   Patient presents with    Cyst     Under right breast   Had for 2 days         History of Present Illness       The patient presents with a painful cystic/lesion under the right breast, which began approximately 2 days ago.  She denies any purulent drainage, fever or chills.  She reports a history of silicone gel-felt breast implants placed few years ago and is concerned about the possible day of implant related complications.  She is seeking evaluation to rule out any associated issues.        Review of Systems   Review of Systems   Constitutional:  Negative for activity change, appetite change, chills and fever.   HENT:  Negative for congestion, postnasal drip, rhinorrhea, sinus pressure, sinus pain, sneezing, sore throat and trouble swallowing.    Respiratory:  Negative for cough, chest tightness,  "shortness of breath and stridor.    Cardiovascular:  Negative for chest pain and palpitations.   Gastrointestinal:  Negative for abdominal pain, diarrhea, nausea and vomiting.   Musculoskeletal:  Negative for arthralgias and myalgias.   Skin:  Positive for color change and wound. Negative for rash.         Current Medications     Current Medications[1]    Current Allergies     Allergies as of 06/24/2025    (No Known Allergies)            The following portions of the patient's history were reviewed and updated as appropriate: allergies, current medications, past family history, past medical history, past social history, past surgical history and problem list.     Past Medical History[2]    Past Surgical History[3]    Family History[4]      Medications have been verified.        Objective   /76   Pulse 70   Temp 98.7 °F (37.1 °C)   Resp 16   Ht 5' 2\" (1.575 m)   Wt 80.3 kg (177 lb)   LMP 06/22/2025 (Exact Date)   SpO2 95%   BMI 32.37 kg/m²   Patient's last menstrual period was 06/22/2025 (exact date).       Physical Exam     Physical Exam  Vitals and nursing note reviewed.   Constitutional:       Appearance: Normal appearance.   HENT:      Head: Normocephalic and atraumatic.      Right Ear: Tympanic membrane, ear canal and external ear normal.      Left Ear: Tympanic membrane, ear canal and external ear normal.      Nose: Nose normal.      Mouth/Throat:      Mouth: Mucous membranes are moist.      Pharynx: Oropharynx is clear.     Eyes:      Conjunctiva/sclera: Conjunctivae normal.       Cardiovascular:      Rate and Rhythm: Normal rate and regular rhythm.      Pulses: Normal pulses.      Heart sounds: Normal heart sounds.   Pulmonary:      Effort: Pulmonary effort is normal.      Breath sounds: Normal breath sounds.     Musculoskeletal:      Cervical back: Normal range of motion. No tenderness.   Lymphadenopathy:      Cervical: No cervical adenopathy.     Skin:     Findings: Abscess, erythema and wound " present. No ecchymosis or rash.      Comments: Inspection of the right inframammary region reveals a localized, tender, raised lesion near the inframammary bra line.  The lesion appears cystic and mildly erythematous, with no visibly purulent drainage or skin breakdown.  Redness and warmth were noted.  The area is tender to palpation.  No lymphadenopathy is appreciated at this time.     Neurological:      Mental Status: She is alert.                          [1]   Current Outpatient Medications:     azelastine (ASTELIN) 0.1 % nasal spray, 2 sprays into each nostril every evening Use in each nostril as directed, Disp: 30 mL, Rfl: 3    cephalexin (KEFLEX) 500 mg capsule, Take 1 capsule (500 mg total) by mouth 3 (three) times a day for 5 days, Disp: 15 capsule, Rfl: 0    cholecalciferol (VITAMIN D3) 400 units tablet, Take 1 tablet (400 Units total) by mouth daily, Disp: 90 tablet, Rfl: 0    Ferrous Sulfate (Iron) 325 (65 Fe) MG TABS, Take 1 tablet (325 mg total) by mouth daily, Disp: 90 tablet, Rfl: 1    montelukast (SINGULAIR) 10 mg tablet, Take 1 tablet (10 mg total) by mouth daily at bedtime, Disp: 30 tablet, Rfl: 5    famotidine (PEPCID) 20 mg tablet, Take 20 mg by mouth in the morning and 20 mg in the evening. (Patient not taking: Reported on 2025), Disp: , Rfl:     omeprazole (PriLOSEC) 40 MG capsule, Take 1 capsule (40 mg total) by mouth in the morning, Disp: 30 capsule, Rfl: 0  [2]   Past Medical History:  Diagnosis Date    Allergic    [3]   Past Surgical History:  Procedure Laterality Date    BREAST IMPLANT       SECTION      x 2c sections   [4]   Family History  Problem Relation Name Age of Onset    Diabetes Mother      Hyperlipidemia Father      Diabetes Brother      Diabetes Maternal Grandmother      Diabetes Maternal Grandfather

## 2025-06-24 NOTE — LETTER
June 24, 2025     Patient: Milana De La Fuente   YOB: 1990   Date of Visit: 6/24/2025       To Whom it May Concern:    Milana De La Fuente was seen in my clinic on 6/24/2025. She may return to work on 06/37/2025.    If you have any questions or concerns, please don't hesitate to call.         Sincerely,          Isela Brooks PA-C        CC: No Recipients

## 2025-06-26 ENCOUNTER — TELEPHONE (OUTPATIENT)
Age: 35
End: 2025-06-26

## 2025-06-26 NOTE — TELEPHONE ENCOUNTER
Called pt to schedule appt for cystic lesion under right breast.  [facility] placed referral to Dr. Steve Gayle.  Tried to schedule pt with Dr. Mcmanus in [place] office on 7/26 or [place] 8/6.  Pt sts that she is leaving on vacation from 7/10-8/20 and she is looking for appt this week.  PT sts that she will contact her PCP and also asked to be scheduled with Dr. Mcmanus when she returns home from vacation.  Scheduled pt on 8/26/25 as per her request.  Also provided pt with contact phone number for Dr. Steve Gayle, [phone].  PT sts that she will call Dr. Gayle's office tomorrow morning, to see if she can get in within the next few days and she will cancel appt on 8/26, if needed.

## 2025-07-08 ENCOUNTER — TELEPHONE (OUTPATIENT)
Dept: FAMILY MEDICINE CLINIC | Facility: CLINIC | Age: 35
End: 2025-07-08